# Patient Record
Sex: FEMALE | Race: ASIAN | Employment: PART TIME | URBAN - METROPOLITAN AREA
[De-identification: names, ages, dates, MRNs, and addresses within clinical notes are randomized per-mention and may not be internally consistent; named-entity substitution may affect disease eponyms.]

---

## 2022-05-12 ENCOUNTER — APPOINTMENT (OUTPATIENT)
Dept: CT IMAGING | Facility: HOSPITAL | Age: 27
End: 2022-05-12
Attending: EMERGENCY MEDICINE

## 2022-05-12 ENCOUNTER — HOSPITAL ENCOUNTER (EMERGENCY)
Facility: HOSPITAL | Age: 27
Discharge: HOME | End: 2022-05-12
Attending: EMERGENCY MEDICINE

## 2022-05-12 VITALS
WEIGHT: 130 LBS | RESPIRATION RATE: 20 BRPM | OXYGEN SATURATION: 100 % | DIASTOLIC BLOOD PRESSURE: 61 MMHG | HEART RATE: 85 BPM | SYSTOLIC BLOOD PRESSURE: 101 MMHG | TEMPERATURE: 98 F

## 2022-05-12 DIAGNOSIS — S51.811A LACERATION OF RIGHT FOREARM, INITIAL ENCOUNTER: ICD-10-CM

## 2022-05-12 DIAGNOSIS — I70.208 RADIAL ARTERY OCCLUSION, RIGHT (HCC): Primary | ICD-10-CM

## 2022-05-12 LAB
ANION GAP SERPL CALC-SCNC: 6 MMOL/L (ref 0–18)
B-HCG UR QL: NEGATIVE
BASOPHILS # BLD AUTO: 0.01 X10(3) UL (ref 0–0.2)
BASOPHILS NFR BLD AUTO: 0.1 %
BUN BLD-MCNC: 9 MG/DL (ref 7–18)
BUN/CREAT SERPL: 13.4 (ref 10–20)
CALCIUM BLD-MCNC: 9 MG/DL (ref 8.5–10.1)
CHLORIDE SERPL-SCNC: 102 MMOL/L (ref 98–112)
CO2 SERPL-SCNC: 27 MMOL/L (ref 21–32)
CREAT BLD-MCNC: 0.67 MG/DL
DEPRECATED RDW RBC AUTO: 36 FL (ref 35.1–46.3)
EOSINOPHIL # BLD AUTO: 0.01 X10(3) UL (ref 0–0.7)
EOSINOPHIL NFR BLD AUTO: 0.1 %
ERYTHROCYTE [DISTWIDTH] IN BLOOD BY AUTOMATED COUNT: 16.7 % (ref 11–15)
GLUCOSE BLD-MCNC: 93 MG/DL (ref 70–99)
HCT VFR BLD AUTO: 37.1 %
HGB BLD-MCNC: 11.6 G/DL
IMM GRANULOCYTES # BLD AUTO: 0.01 X10(3) UL (ref 0–1)
IMM GRANULOCYTES NFR BLD: 0.1 %
LYMPHOCYTES # BLD AUTO: 1.45 X10(3) UL (ref 1–4)
LYMPHOCYTES NFR BLD AUTO: 19.4 %
MCH RBC QN AUTO: 20.3 PG (ref 26–34)
MCHC RBC AUTO-ENTMCNC: 31.3 G/DL (ref 31–37)
MCV RBC AUTO: 65 FL
MONOCYTES # BLD AUTO: 0.55 X10(3) UL (ref 0.1–1)
MONOCYTES NFR BLD AUTO: 7.4 %
NEUTROPHILS # BLD AUTO: 5.45 X10 (3) UL (ref 1.5–7.7)
NEUTROPHILS # BLD AUTO: 5.45 X10(3) UL (ref 1.5–7.7)
NEUTROPHILS NFR BLD AUTO: 72.9 %
OSMOLALITY SERPL CALC.SUM OF ELEC: 278 MOSM/KG (ref 275–295)
PLATELET # BLD AUTO: 317 10(3)UL (ref 150–450)
POTASSIUM SERPL-SCNC: 3.9 MMOL/L (ref 3.5–5.1)
RBC # BLD AUTO: 5.71 X10(6)UL
SARS-COV-2 RNA RESP QL NAA+PROBE: NOT DETECTED
SODIUM SERPL-SCNC: 135 MMOL/L (ref 136–145)
WBC # BLD AUTO: 7.5 X10(3) UL (ref 4–11)

## 2022-05-12 PROCEDURE — 73206 CT ANGIO UPR EXTRM W/O&W/DYE: CPT | Performed by: EMERGENCY MEDICINE

## 2022-05-12 PROCEDURE — 99285 EMERGENCY DEPT VISIT HI MDM: CPT

## 2022-05-12 PROCEDURE — 36415 COLL VENOUS BLD VENIPUNCTURE: CPT

## 2022-05-12 PROCEDURE — 80048 BASIC METABOLIC PNL TOTAL CA: CPT | Performed by: EMERGENCY MEDICINE

## 2022-05-12 PROCEDURE — 85060 BLOOD SMEAR INTERPRETATION: CPT | Performed by: EMERGENCY MEDICINE

## 2022-05-12 PROCEDURE — 12001 RPR S/N/AX/GEN/TRNK 2.5CM/<: CPT

## 2022-05-12 PROCEDURE — 85025 COMPLETE CBC W/AUTO DIFF WBC: CPT | Performed by: EMERGENCY MEDICINE

## 2022-05-12 PROCEDURE — 81025 URINE PREGNANCY TEST: CPT

## 2022-05-12 PROCEDURE — 90471 IMMUNIZATION ADMIN: CPT

## 2022-05-12 RX ORDER — HYDROCODONE BITARTRATE AND ACETAMINOPHEN 5; 325 MG/1; MG/1
1 TABLET ORAL ONCE
Status: COMPLETED | OUTPATIENT
Start: 2022-05-12 | End: 2022-05-12

## 2022-05-12 RX ORDER — LIDOCAINE HYDROCHLORIDE AND EPINEPHRINE 20; 5 MG/ML; UG/ML
INJECTION, SOLUTION EPIDURAL; INFILTRATION; INTRACAUDAL; PERINEURAL
Status: COMPLETED
Start: 2022-05-12 | End: 2022-05-12

## 2022-05-12 RX ORDER — ONDANSETRON 4 MG/1
4 TABLET, ORALLY DISINTEGRATING ORAL ONCE
Status: COMPLETED | OUTPATIENT
Start: 2022-05-12 | End: 2022-05-12

## 2022-05-12 RX ORDER — ONDANSETRON 4 MG/1
4 TABLET, ORALLY DISINTEGRATING ORAL ONCE
Status: DISCONTINUED | OUTPATIENT
Start: 2022-05-12 | End: 2022-05-12

## 2022-05-12 RX ORDER — HYDROCODONE BITARTRATE AND ACETAMINOPHEN 5; 325 MG/1; MG/1
1 TABLET ORAL ONCE
Status: DISCONTINUED | OUTPATIENT
Start: 2022-05-12 | End: 2022-05-12

## 2022-05-12 RX ORDER — LIDOCAINE HYDROCHLORIDE AND EPINEPHRINE 20; 5 MG/ML; UG/ML
20 INJECTION, SOLUTION EPIDURAL; INFILTRATION; INTRACAUDAL; PERINEURAL ONCE
Status: COMPLETED | OUTPATIENT
Start: 2022-05-12 | End: 2022-05-12

## 2022-05-12 NOTE — ED QUICK NOTES
Sutures in place to right lower forearm laceration. 2x2 gauze and wrap in place. Discharge paperwork and follow-up discussed with pt, pt verbally understands them. Pt discharged ambulatory with steady gait - no distress noted.

## 2022-05-12 NOTE — ED QUICK NOTES
Orders for admission, patient is aware of plan and ready to go upstairs. Any questions, please call ED RN Bita  at extension [phone].    Type of COVID test sent: Rapid  COVID Suspicion level: Low    Titratable drug(s) infusing:   Rate:    LOC at time of transport:  A&OX[facility]    Other pertinent information    CIWA score=  NIH score=

## 2023-09-12 ENCOUNTER — EXTERNAL LAB (OUTPATIENT)
Dept: OTHER | Age: 28
End: 2023-09-12

## 2023-09-12 LAB — LAB RESULT: NORMAL

## 2023-09-14 ENCOUNTER — HOSPITAL ENCOUNTER (OUTPATIENT)
Age: 28
Discharge: HOME | End: 2023-09-14
Attending: OBSTETRICS & GYNECOLOGY | Admitting: OBSTETRICS & GYNECOLOGY

## 2023-09-14 ENCOUNTER — ANESTHESIA (OUTPATIENT)
Dept: SURGERY | Age: 28
End: 2023-09-14

## 2023-09-14 ENCOUNTER — ANESTHESIA EVENT (OUTPATIENT)
Dept: SURGERY | Age: 28
End: 2023-09-14

## 2023-09-14 LAB
ABO + RH BLD: NORMAL
BASOPHILS # BLD: 0 K/MCL (ref 0–0.3)
BASOPHILS NFR BLD: 0 %
BLD GP AB SCN SERPL QL GEL: NEGATIVE
DEPRECATED RDW RBC: 38.7 FL (ref 39–50)
EOSINOPHIL # BLD: 0 K/MCL (ref 0–0.5)
EOSINOPHIL NFR BLD: 0 %
ERYTHROCYTE [DISTWIDTH] IN BLOOD: 18.6 % (ref 11–15)
HCT VFR BLD CALC: 35.4 % (ref 36–46.5)
HGB BLD-MCNC: 11.7 G/DL (ref 12–15.5)
IMM GRANULOCYTES # BLD AUTO: 0 K/MCL (ref 0–0.2)
IMM GRANULOCYTES # BLD: 0 %
LYMPHOCYTES # BLD: 1.6 K/MCL (ref 1–4.8)
LYMPHOCYTES NFR BLD: 17 %
MCH RBC QN AUTO: 21.1 PG (ref 26–34)
MCHC RBC AUTO-ENTMCNC: 33.1 G/DL (ref 32–36.5)
MCV RBC AUTO: 63.9 FL (ref 78–100)
MONOCYTES # BLD: 0.5 K/MCL (ref 0.3–0.9)
MONOCYTES NFR BLD: 5 %
NEUTROPHILS # BLD: 7.7 K/MCL (ref 1.8–7.7)
NEUTROPHILS NFR BLD: 78 %
NRBC BLD MANUAL-RTO: 0 /100 WBC
PLATELET # BLD AUTO: 366 K/MCL (ref 140–450)
RAINBOW EXTRA TUBES HOLD SPECIMEN: NORMAL
RBC # BLD: 5.54 MIL/MCL (ref 4–5.2)
TYPE AND SCREEN EXPIRATION DATE: NORMAL
WBC # BLD: 9.9 K/MCL (ref 4.2–11)

## 2023-09-14 PROCEDURE — 88274 CYTOGENETICS 25-99: CPT | Performed by: OBSTETRICS & GYNECOLOGY

## 2023-09-14 PROCEDURE — 10002803 HB RX 637: Performed by: OBSTETRICS & GYNECOLOGY

## 2023-09-14 PROCEDURE — 10002801 HB RX 250 W/O HCPCS: Performed by: OBSTETRICS & GYNECOLOGY

## 2023-09-14 PROCEDURE — 10002801 HB RX 250 W/O HCPCS: Performed by: ANESTHESIOLOGY

## 2023-09-14 PROCEDURE — 13000035 HB BASIC CASE EA ADD MINUTE: Performed by: OBSTETRICS & GYNECOLOGY

## 2023-09-14 PROCEDURE — 85025 COMPLETE CBC W/AUTO DIFF WBC: CPT | Performed by: OBSTETRICS & GYNECOLOGY

## 2023-09-14 PROCEDURE — 10002800 HB RX 250 W HCPCS: Performed by: ANESTHESIOLOGY

## 2023-09-14 PROCEDURE — 10004451 HB PACU RECOVERY 1ST 30 MINUTES: Performed by: OBSTETRICS & GYNECOLOGY

## 2023-09-14 PROCEDURE — 13000006 HB ANESTHESIA MAC S/U + 1ST 15 MIN: Performed by: OBSTETRICS & GYNECOLOGY

## 2023-09-14 PROCEDURE — 86900 BLOOD TYPING SEROLOGIC ABO: CPT | Performed by: OBSTETRICS & GYNECOLOGY

## 2023-09-14 PROCEDURE — 13000034 HB BASIC CASE  S/U +1ST 15 MIN: Performed by: OBSTETRICS & GYNECOLOGY

## 2023-09-14 PROCEDURE — A59820 ANES TX MISSED AB COMPLT SURGICALLY; FIR: Performed by: NURSE ANESTHETIST, CERTIFIED REGISTERED

## 2023-09-14 PROCEDURE — 88342 IMHCHEM/IMCYTCHM 1ST ANTB: CPT | Performed by: OBSTETRICS & GYNECOLOGY

## 2023-09-14 PROCEDURE — 10002807 HB RX 258: Performed by: ANESTHESIOLOGY

## 2023-09-14 PROCEDURE — A59820 ANES TX MISSED AB COMPLT SURGICALLY; FIR: Performed by: ANESTHESIOLOGY

## 2023-09-14 PROCEDURE — 88271 CYTOGENETICS DNA PROBE: CPT | Performed by: OBSTETRICS & GYNECOLOGY

## 2023-09-14 PROCEDURE — 10004452 HB PACU ADDL 30 MINUTES: Performed by: OBSTETRICS & GYNECOLOGY

## 2023-09-14 PROCEDURE — 13000001 HB PHASE II RECOVERY EA 30 MINUTES: Performed by: OBSTETRICS & GYNECOLOGY

## 2023-09-14 PROCEDURE — 13000007 HB ANESTHESIA MAC EA ADD MINUTE: Performed by: OBSTETRICS & GYNECOLOGY

## 2023-09-14 RX ORDER — PROPOFOL 10 MG/ML
INJECTION, EMULSION INTRAVENOUS PRN
Status: DISCONTINUED | OUTPATIENT
Start: 2023-09-14 | End: 2023-09-14

## 2023-09-14 RX ORDER — ACETAMINOPHEN 650 MG/1
650 SUPPOSITORY RECTAL EVERY 4 HOURS PRN
Status: DISCONTINUED | OUTPATIENT
Start: 2023-09-14 | End: 2023-09-14 | Stop reason: HOSPADM

## 2023-09-14 RX ORDER — DEXAMETHASONE SODIUM PHOSPHATE 4 MG/ML
INJECTION, SOLUTION INTRA-ARTICULAR; INTRALESIONAL; INTRAMUSCULAR; INTRAVENOUS; SOFT TISSUE PRN
Status: DISCONTINUED | OUTPATIENT
Start: 2023-09-14 | End: 2023-09-14

## 2023-09-14 RX ORDER — ACETAMINOPHEN 325 MG/1
650 TABLET ORAL EVERY 4 HOURS PRN
Status: DISCONTINUED | OUTPATIENT
Start: 2023-09-14 | End: 2023-09-14 | Stop reason: HOSPADM

## 2023-09-14 RX ORDER — 0.9 % SODIUM CHLORIDE 0.9 %
2 VIAL (ML) INJECTION EVERY 12 HOURS SCHEDULED
Status: DISCONTINUED | OUTPATIENT
Start: 2023-09-14 | End: 2023-09-14 | Stop reason: HOSPADM

## 2023-09-14 RX ORDER — DOXYCYCLINE HYCLATE 100 MG/1
100 CAPSULE ORAL ONCE
Status: COMPLETED | OUTPATIENT
Start: 2023-09-14 | End: 2023-09-14

## 2023-09-14 RX ORDER — ONDANSETRON 2 MG/ML
4 INJECTION INTRAMUSCULAR; INTRAVENOUS
Status: DISCONTINUED | OUTPATIENT
Start: 2023-09-14 | End: 2023-09-14 | Stop reason: HOSPADM

## 2023-09-14 RX ORDER — ONDANSETRON 4 MG/1
4 TABLET, ORALLY DISINTEGRATING ORAL EVERY 12 HOURS PRN
Status: DISCONTINUED | OUTPATIENT
Start: 2023-09-14 | End: 2023-09-14 | Stop reason: HOSPADM

## 2023-09-14 RX ORDER — ONDANSETRON 2 MG/ML
4 INJECTION INTRAMUSCULAR; INTRAVENOUS EVERY 12 HOURS PRN
Status: DISCONTINUED | OUTPATIENT
Start: 2023-09-14 | End: 2023-09-14 | Stop reason: HOSPADM

## 2023-09-14 RX ORDER — ALBUTEROL SULFATE 2.5 MG/3ML
2.5 SOLUTION RESPIRATORY (INHALATION)
Status: DISCONTINUED | OUTPATIENT
Start: 2023-09-14 | End: 2023-09-14 | Stop reason: HOSPADM

## 2023-09-14 RX ORDER — NALBUPHINE HYDROCHLORIDE 10 MG/ML
2.5 INJECTION, SOLUTION INTRAMUSCULAR; INTRAVENOUS; SUBCUTANEOUS
Status: DISCONTINUED | OUTPATIENT
Start: 2023-09-14 | End: 2023-09-14 | Stop reason: HOSPADM

## 2023-09-14 RX ORDER — PROCHLORPERAZINE EDISYLATE 5 MG/ML
5 INJECTION INTRAMUSCULAR; INTRAVENOUS
Status: DISCONTINUED | OUTPATIENT
Start: 2023-09-14 | End: 2023-09-14 | Stop reason: HOSPADM

## 2023-09-14 RX ORDER — MAGNESIUM HYDROXIDE 1200 MG/15ML
LIQUID ORAL PRN
Status: DISCONTINUED | OUTPATIENT
Start: 2023-09-14 | End: 2023-09-14 | Stop reason: HOSPADM

## 2023-09-14 RX ORDER — ONDANSETRON 2 MG/ML
INJECTION INTRAMUSCULAR; INTRAVENOUS PRN
Status: DISCONTINUED | OUTPATIENT
Start: 2023-09-14 | End: 2023-09-14

## 2023-09-14 RX ORDER — HYDROCODONE BITARTRATE AND ACETAMINOPHEN 5; 325 MG/1; MG/1
1 TABLET ORAL
Status: DISCONTINUED | OUTPATIENT
Start: 2023-09-14 | End: 2023-09-14 | Stop reason: HOSPADM

## 2023-09-14 RX ORDER — NALOXONE HCL 0.4 MG/ML
0.2 VIAL (ML) INJECTION EVERY 5 MIN PRN
Status: DISCONTINUED | OUTPATIENT
Start: 2023-09-14 | End: 2023-09-14 | Stop reason: HOSPADM

## 2023-09-14 RX ORDER — SODIUM CHLORIDE, SODIUM LACTATE, POTASSIUM CHLORIDE, CALCIUM CHLORIDE 600; 310; 30; 20 MG/100ML; MG/100ML; MG/100ML; MG/100ML
INJECTION, SOLUTION INTRAVENOUS CONTINUOUS PRN
Status: DISCONTINUED | OUTPATIENT
Start: 2023-09-14 | End: 2023-09-14

## 2023-09-14 RX ADMIN — ONDANSETRON 4 MG: 2 INJECTION INTRAMUSCULAR; INTRAVENOUS at 11:42

## 2023-09-14 RX ADMIN — PROPOFOL 50 MG: 10 INJECTION, EMULSION INTRAVENOUS at 11:35

## 2023-09-14 RX ADMIN — DOXYCYCLINE 100 MG: 100 CAPSULE ORAL at 10:26

## 2023-09-14 RX ADMIN — FENTANYL CITRATE 50 MCG: 50 INJECTION, SOLUTION INTRAMUSCULAR; INTRAVENOUS at 12:00

## 2023-09-14 RX ADMIN — SODIUM CHLORIDE, POTASSIUM CHLORIDE, SODIUM LACTATE AND CALCIUM CHLORIDE: 600; 310; 30; 20 INJECTION, SOLUTION INTRAVENOUS at 12:06

## 2023-09-14 RX ADMIN — PROPOFOL 30 MG: 10 INJECTION, EMULSION INTRAVENOUS at 11:36

## 2023-09-14 RX ADMIN — FENTANYL CITRATE 50 MCG: 50 INJECTION, SOLUTION INTRAMUSCULAR; INTRAVENOUS at 11:34

## 2023-09-14 RX ADMIN — KETOROLAC TROMETHAMINE 30 MG: 30 INJECTION, SOLUTION INTRAMUSCULAR at 12:04

## 2023-09-14 RX ADMIN — DEXAMETHASONE SODIUM PHOSPHATE 4 MG: 4 INJECTION, SOLUTION INTRAMUSCULAR; INTRAVENOUS at 11:42

## 2023-09-14 RX ADMIN — PROPOFOL 160 MCG/KG/MIN: 10 INJECTION, EMULSION INTRAVENOUS at 11:34

## 2023-09-14 RX ADMIN — SODIUM CHLORIDE, POTASSIUM CHLORIDE, SODIUM LACTATE AND CALCIUM CHLORIDE: 600; 310; 30; 20 INJECTION, SOLUTION INTRAVENOUS at 11:28

## 2023-09-14 ASSESSMENT — ACTIVITIES OF DAILY LIVING (ADL)
NEEDS_ASSIST: NO
HISTORY OF FALLING IN THE LAST YEAR (PRIOR TO ADMISSION): NO
ADL_BEFORE_ADMISSION: INDEPENDENT
SENSORY_SUPPORT_DEVICES: EYEGLASSES
CHRONIC_PAIN_PRESENT: NO
ADL_SHORT_OF_BREATH: NO
RECENT_DECLINE_ADL: NO
ADL_SCORE: 12

## 2023-09-14 ASSESSMENT — COGNITIVE AND FUNCTIONAL STATUS - GENERAL
ARE YOU DEAF OR DO YOU HAVE SERIOUS DIFFICULTY  HEARING: NO
ARE YOU BLIND OR DO YOU HAVE SERIOUS DIFFICULTY SEEING, EVEN WHEN WEARING GLASSES: NO

## 2023-09-14 ASSESSMENT — PAIN SCALES - GENERAL
PAINLEVEL_OUTOF10: 0
PAINLEVEL_OUTOF10: 1
PAINLEVEL_OUTOF10: 0
PAINLEVEL_OUTOF10: 1

## 2023-09-15 VITALS
DIASTOLIC BLOOD PRESSURE: 83 MMHG | HEART RATE: 72 BPM | RESPIRATION RATE: 16 BRPM | SYSTOLIC BLOOD PRESSURE: 120 MMHG | OXYGEN SATURATION: 100 % | TEMPERATURE: 97.5 F | HEIGHT: 64 IN | BODY MASS INDEX: 26.35 KG/M2 | WEIGHT: 154.32 LBS

## 2023-09-21 ENCOUNTER — ANESTHESIA (OUTPATIENT)
Dept: SURGERY | Age: 28
End: 2023-09-21

## 2023-09-21 ENCOUNTER — ANESTHESIA EVENT (OUTPATIENT)
Dept: SURGERY | Age: 28
End: 2023-09-21

## 2023-09-21 ENCOUNTER — HOSPITAL ENCOUNTER (OUTPATIENT)
Age: 28
Discharge: HOME | End: 2023-09-21
Attending: OBSTETRICS & GYNECOLOGY | Admitting: OBSTETRICS & GYNECOLOGY

## 2023-09-21 VITALS
DIASTOLIC BLOOD PRESSURE: 83 MMHG | SYSTOLIC BLOOD PRESSURE: 116 MMHG | RESPIRATION RATE: 19 BRPM | OXYGEN SATURATION: 100 % | WEIGHT: 153.66 LBS | HEART RATE: 99 BPM | BODY MASS INDEX: 26.23 KG/M2 | HEIGHT: 64 IN | TEMPERATURE: 97.3 F

## 2023-09-21 LAB
ALBUMIN SERPL-MCNC: 3.6 G/DL (ref 3.6–5.1)
ALBUMIN/GLOB SERPL: 1 {RATIO} (ref 1–2.4)
ALP SERPL-CCNC: 89 UNITS/L (ref 45–117)
ALT SERPL-CCNC: 27 UNITS/L
ANION GAP SERPL CALC-SCNC: 8 MMOL/L (ref 7–19)
ASR DISCLAIMER: NORMAL
AST SERPL-CCNC: 25 UNITS/L
BASOPHILS # BLD: 0 K/MCL (ref 0–0.3)
BASOPHILS NFR BLD: 0 %
BILIRUB SERPL-MCNC: 0.5 MG/DL (ref 0.2–1)
BUN SERPL-MCNC: 8 MG/DL (ref 6–20)
BUN/CREAT SERPL: 14 (ref 7–25)
CALCIUM SERPL-MCNC: 8.2 MG/DL (ref 8.4–10.2)
CASE RPRT: NORMAL
CHLORIDE SERPL-SCNC: 109 MMOL/L (ref 97–110)
CLINICAL INFO: NORMAL
CO2 SERPL-SCNC: 24 MMOL/L (ref 21–32)
CREAT SERPL-MCNC: 0.57 MG/DL (ref 0.51–0.95)
DEPRECATED RDW RBC: 42.4 FL (ref 39–50)
EGFRCR SERPLBLD CKD-EPI 2021: >90 ML/MIN/{1.73_M2}
EOSINOPHIL # BLD: 0 K/MCL (ref 0–0.5)
EOSINOPHIL NFR BLD: 0 %
ERYTHROCYTE [DISTWIDTH] IN BLOOD: 19.4 % (ref 11–15)
FASTING DURATION TIME PATIENT: ABNORMAL H
GLOBULIN SER-MCNC: 3.5 G/DL (ref 2–4)
GLUCOSE SERPL-MCNC: 79 MG/DL (ref 70–99)
HCG SERPL-ACNC: 8821 MUNITS/ML
HCT VFR BLD CALC: 33.5 % (ref 36–46.5)
HGB BLD-MCNC: 10.5 G/DL (ref 12–15.5)
IMM GRANULOCYTES # BLD AUTO: 0 K/MCL (ref 0–0.2)
IMM GRANULOCYTES # BLD: 0 %
LYMPHOCYTES # BLD: 1.9 K/MCL (ref 1–4.8)
LYMPHOCYTES NFR BLD: 18 %
MCH RBC QN AUTO: 20.8 PG (ref 26–34)
MCHC RBC AUTO-ENTMCNC: 31.3 G/DL (ref 32–36.5)
MCV RBC AUTO: 66.5 FL (ref 78–100)
MONOCYTES # BLD: 0.6 K/MCL (ref 0.3–0.9)
MONOCYTES NFR BLD: 6 %
NEUTROPHILS # BLD: 7.9 K/MCL (ref 1.8–7.7)
NEUTROPHILS NFR BLD: 76 %
NRBC BLD MANUAL-RTO: 0 /100 WBC
PATH REPORT.FINAL DX SPEC: NORMAL
PATH REPORT.FINAL DX SPEC: NORMAL
PATH REPORT.GROSS SPEC: NORMAL
PLATELET # BLD AUTO: 374 K/MCL (ref 140–450)
POTASSIUM SERPL-SCNC: 4 MMOL/L (ref 3.4–5.1)
PROT SERPL-MCNC: 7.1 G/DL (ref 6.4–8.2)
RBC # BLD: 5.04 MIL/MCL (ref 4–5.2)
SODIUM SERPL-SCNC: 137 MMOL/L (ref 135–145)
WBC # BLD: 10.5 K/MCL (ref 4.2–11)

## 2023-09-21 PROCEDURE — 13000034 HB BASIC CASE  S/U +1ST 15 MIN: Performed by: OBSTETRICS & GYNECOLOGY

## 2023-09-21 PROCEDURE — 99140 ANES COMP EMERGENCY COND: CPT | Performed by: NURSE ANESTHETIST, CERTIFIED REGISTERED

## 2023-09-21 PROCEDURE — 10002801 HB RX 250 W/O HCPCS: Performed by: NURSE ANESTHETIST, CERTIFIED REGISTERED

## 2023-09-21 PROCEDURE — 13000003 HB ANESTHESIA  GENERAL EA ADD MINUTE: Performed by: OBSTETRICS & GYNECOLOGY

## 2023-09-21 PROCEDURE — 10002800 HB RX 250 W HCPCS: Performed by: NURSE ANESTHETIST, CERTIFIED REGISTERED

## 2023-09-21 PROCEDURE — 10002803 HB RX 637: Performed by: OBSTETRICS & GYNECOLOGY

## 2023-09-21 PROCEDURE — 99140 ANES COMP EMERGENCY COND: CPT | Performed by: ANESTHESIOLOGY

## 2023-09-21 PROCEDURE — 84702 CHORIONIC GONADOTROPIN TEST: CPT | Performed by: OBSTETRICS & GYNECOLOGY

## 2023-09-21 PROCEDURE — 10004451 HB PACU RECOVERY 1ST 30 MINUTES: Performed by: OBSTETRICS & GYNECOLOGY

## 2023-09-21 PROCEDURE — A58120 ANES DILAT & CURET DX &/OR THERAP (NON O: Performed by: ANESTHESIOLOGY

## 2023-09-21 PROCEDURE — A58120 ANES DILAT & CURET DX &/OR THERAP (NON O: Performed by: NURSE ANESTHETIST, CERTIFIED REGISTERED

## 2023-09-21 PROCEDURE — 80053 COMPREHEN METABOLIC PANEL: CPT | Performed by: OBSTETRICS & GYNECOLOGY

## 2023-09-21 PROCEDURE — 85025 COMPLETE CBC W/AUTO DIFF WBC: CPT | Performed by: OBSTETRICS & GYNECOLOGY

## 2023-09-21 PROCEDURE — 13000001 HB PHASE II RECOVERY EA 30 MINUTES: Performed by: OBSTETRICS & GYNECOLOGY

## 2023-09-21 PROCEDURE — 88305 TISSUE EXAM BY PATHOLOGIST: CPT | Performed by: OBSTETRICS & GYNECOLOGY

## 2023-09-21 PROCEDURE — 10002801 HB RX 250 W/O HCPCS: Performed by: OBSTETRICS & GYNECOLOGY

## 2023-09-21 PROCEDURE — 10004452 HB PACU ADDL 30 MINUTES: Performed by: OBSTETRICS & GYNECOLOGY

## 2023-09-21 PROCEDURE — 10002807 HB RX 258: Performed by: NURSE ANESTHETIST, CERTIFIED REGISTERED

## 2023-09-21 PROCEDURE — 13000002 HB ANESTHESIA  GENERAL  S/U + 1ST 15 MIN: Performed by: OBSTETRICS & GYNECOLOGY

## 2023-09-21 PROCEDURE — 13000035 HB BASIC CASE EA ADD MINUTE: Performed by: OBSTETRICS & GYNECOLOGY

## 2023-09-21 RX ORDER — DEXAMETHASONE SODIUM PHOSPHATE 4 MG/ML
INJECTION, SOLUTION INTRA-ARTICULAR; INTRALESIONAL; INTRAMUSCULAR; INTRAVENOUS; SOFT TISSUE PRN
Status: DISCONTINUED | OUTPATIENT
Start: 2023-09-21 | End: 2023-09-21

## 2023-09-21 RX ORDER — ACETAMINOPHEN 325 MG/1
650 TABLET ORAL EVERY 4 HOURS PRN
Status: DISCONTINUED | OUTPATIENT
Start: 2023-09-21 | End: 2023-09-26 | Stop reason: HOSPADM

## 2023-09-21 RX ORDER — DOXYCYCLINE HYCLATE 100 MG/1
200 CAPSULE ORAL ONCE
Status: COMPLETED | OUTPATIENT
Start: 2023-09-21 | End: 2023-09-21

## 2023-09-21 RX ORDER — METOCLOPRAMIDE HYDROCHLORIDE 5 MG/ML
INJECTION INTRAMUSCULAR; INTRAVENOUS PRN
Status: DISCONTINUED | OUTPATIENT
Start: 2023-09-21 | End: 2023-09-21

## 2023-09-21 RX ORDER — DOXYCYCLINE HYCLATE 100 MG/1
100 CAPSULE ORAL 2 TIMES DAILY
Qty: 20 CAPSULE | Refills: 0 | Status: SHIPPED | OUTPATIENT
Start: 2023-09-21 | End: 2023-10-01

## 2023-09-21 RX ORDER — MAGNESIUM HYDROXIDE 1200 MG/15ML
LIQUID ORAL PRN
Status: DISCONTINUED | OUTPATIENT
Start: 2023-09-21 | End: 2023-09-21 | Stop reason: HOSPADM

## 2023-09-21 RX ORDER — ACETAMINOPHEN 650 MG/1
650 SUPPOSITORY RECTAL EVERY 4 HOURS PRN
Status: DISCONTINUED | OUTPATIENT
Start: 2023-09-21 | End: 2023-09-26 | Stop reason: HOSPADM

## 2023-09-21 RX ORDER — LIDOCAINE HYDROCHLORIDE 20 MG/ML
INJECTION, SOLUTION INFILTRATION; PERINEURAL PRN
Status: DISCONTINUED | OUTPATIENT
Start: 2023-09-21 | End: 2023-09-21

## 2023-09-21 RX ORDER — PROPOFOL 10 MG/ML
INJECTION, EMULSION INTRAVENOUS PRN
Status: DISCONTINUED | OUTPATIENT
Start: 2023-09-21 | End: 2023-09-21

## 2023-09-21 RX ORDER — SODIUM CHLORIDE, SODIUM LACTATE, POTASSIUM CHLORIDE, CALCIUM CHLORIDE 600; 310; 30; 20 MG/100ML; MG/100ML; MG/100ML; MG/100ML
INJECTION, SOLUTION INTRAVENOUS CONTINUOUS PRN
Status: DISCONTINUED | OUTPATIENT
Start: 2023-09-21 | End: 2023-09-21

## 2023-09-21 RX ORDER — ONDANSETRON 2 MG/ML
INJECTION INTRAMUSCULAR; INTRAVENOUS PRN
Status: DISCONTINUED | OUTPATIENT
Start: 2023-09-21 | End: 2023-09-21

## 2023-09-21 RX ORDER — ONDANSETRON 2 MG/ML
4 INJECTION INTRAMUSCULAR; INTRAVENOUS 2 TIMES DAILY PRN
Status: DISCONTINUED | OUTPATIENT
Start: 2023-09-21 | End: 2023-09-26 | Stop reason: HOSPADM

## 2023-09-21 RX ORDER — DIPHENHYDRAMINE HYDROCHLORIDE 50 MG/ML
12.5 INJECTION INTRAMUSCULAR; INTRAVENOUS EVERY 4 HOURS PRN
Status: DISCONTINUED | OUTPATIENT
Start: 2023-09-21 | End: 2023-09-26 | Stop reason: HOSPADM

## 2023-09-21 RX ADMIN — DEXAMETHASONE SODIUM PHOSPHATE 4 MG: 4 INJECTION, SOLUTION INTRAMUSCULAR; INTRAVENOUS at 18:07

## 2023-09-21 RX ADMIN — KETOROLAC TROMETHAMINE 30 MG: 30 INJECTION, SOLUTION INTRAMUSCULAR at 18:37

## 2023-09-21 RX ADMIN — ONDANSETRON 4 MG: 2 INJECTION INTRAMUSCULAR; INTRAVENOUS at 19:58

## 2023-09-21 RX ADMIN — LIDOCAINE HYDROCHLORIDE 5 ML: 20 INJECTION, SOLUTION INFILTRATION; PERINEURAL at 18:05

## 2023-09-21 RX ADMIN — FENTANYL CITRATE 25 MCG: 50 INJECTION, SOLUTION INTRAMUSCULAR; INTRAVENOUS at 19:01

## 2023-09-21 RX ADMIN — ONDANSETRON 4 MG: 2 INJECTION INTRAMUSCULAR; INTRAVENOUS at 18:07

## 2023-09-21 RX ADMIN — FENTANYL CITRATE 50 MCG: 50 INJECTION, SOLUTION INTRAMUSCULAR; INTRAVENOUS at 18:26

## 2023-09-21 RX ADMIN — FENTANYL CITRATE 50 MCG: 50 INJECTION, SOLUTION INTRAMUSCULAR; INTRAVENOUS at 18:05

## 2023-09-21 RX ADMIN — METOCLOPRAMIDE 10 MG: 5 INJECTION, SOLUTION INTRAMUSCULAR; INTRAVENOUS at 18:07

## 2023-09-21 RX ADMIN — DOXYCYCLINE 200 MG: 100 CAPSULE ORAL at 15:16

## 2023-09-21 RX ADMIN — PROPOFOL 200 MG: 10 INJECTION, EMULSION INTRAVENOUS at 18:05

## 2023-09-21 RX ADMIN — SODIUM CHLORIDE, POTASSIUM CHLORIDE, SODIUM LACTATE AND CALCIUM CHLORIDE: 600; 310; 30; 20 INJECTION, SOLUTION INTRAVENOUS at 17:57

## 2023-09-21 ASSESSMENT — PAIN SCALES - GENERAL
PAINLEVEL_OUTOF10: 0
PAINLEVEL_OUTOF10: 5
PAINLEVEL_OUTOF10: 4
PAINLEVEL_OUTOF10: 0

## 2023-09-21 ASSESSMENT — PAIN SCALES - WONG BAKER
WONGBAKER_NUMERICALRESPONSE: 2
WONGBAKER_NUMERICALRESPONSE: 4

## 2023-09-22 LAB
BANDING TECHNIQUE: ABNORMAL
CELLS COUNTED: ABNORMAL
GENE ANALYSIS NARR RPT DOC: ABNORMAL
INTERPRETATION: ABNORMAL
KARYOTYP CVS: ABNORMAL
Lab: ABNORMAL
Lab: ABNORMAL
RESULT CATEGORY: ABNORMAL

## 2023-09-25 LAB
ASR DISCLAIMER: NORMAL
CASE RPRT: NORMAL
CLINICAL INFO: NORMAL
PATH REPORT.FINAL DX SPEC: NORMAL
PATH REPORT.GROSS SPEC: NORMAL

## 2023-11-01 ENCOUNTER — HOSPITAL ENCOUNTER (EMERGENCY)
Age: 28
Discharge: LEFT BEFORE DISCHARGE | End: 2023-11-01

## 2023-11-01 VITALS
TEMPERATURE: 98.1 F | HEART RATE: 104 BPM | OXYGEN SATURATION: 100 % | RESPIRATION RATE: 18 BRPM | SYSTOLIC BLOOD PRESSURE: 137 MMHG | DIASTOLIC BLOOD PRESSURE: 81 MMHG

## 2023-11-01 PROCEDURE — 10003627 HB COUNTER ED NO SERVICE

## 2023-11-01 ASSESSMENT — PAIN SCALES - GENERAL: PAINLEVEL_OUTOF10: 9

## 2024-03-13 ENCOUNTER — TELEPHONE (OUTPATIENT)
Dept: OBGYN CLINIC | Facility: CLINIC | Age: 29
End: 2024-03-13

## 2024-03-13 NOTE — TELEPHONE ENCOUNTER
Pt called to sched OBN appt. LMP 2/13/24 +hpt.   Spoke with pt about male providers and need to see them throughout the pregnancy, pt expressed understanding about need to see All providers throughout pregnancy since it will be the Dr on call for the delivery. Apt for OBN made 4/3/24. Pt will start PRN vit DHA, Folic acid, Iron.  Ht 5'4\" wt154   BMI 26.4

## 2024-03-28 ENCOUNTER — TELEPHONE (OUTPATIENT)
Dept: OBGYN CLINIC | Facility: CLINIC | Age: 29
End: 2024-03-28

## 2024-03-28 NOTE — TELEPHONE ENCOUNTER
6w3d based on LMP of 2/13/2024. Hx of partial molar pregnancy. Pt is worried d/t this hx and is asking when she will have her first ultrasound. Pt informed that ultrasound will be completed an NPN appt. Pt denies any one sided pain, no spotting or bleeding. We discussed bleeding, pain and ectopic precautions. Pt states understanding.

## 2024-03-28 NOTE — TELEPHONE ENCOUNTER
Previous pregnancy was a Partial molar pregnancy that miscarried.    4/3 will be pt 7th week and concerned about previous history and asking if ultrasound could be ordered. And if dr James appt could be made so she is not stressing over this.

## 2024-04-03 ENCOUNTER — NURSE ONLY (OUTPATIENT)
Dept: OBGYN CLINIC | Facility: CLINIC | Age: 29
End: 2024-04-03

## 2024-04-03 VITALS — HEIGHT: 64 IN | BODY MASS INDEX: 22 KG/M2

## 2024-04-03 DIAGNOSIS — Z34.81 ENCOUNTER FOR SUPERVISION OF OTHER NORMAL PREGNANCY IN FIRST TRIMESTER (HCC): Primary | ICD-10-CM

## 2024-04-03 RX ORDER — CHOLECALCIFEROL (VITAMIN D3) 25 MCG
1 TABLET,CHEWABLE ORAL DAILY
COMMUNITY

## 2024-04-03 NOTE — PROGRESS NOTES
Pt seen for OBN appt today with no complaints. Normal PN labs, plus varicella, and HCG qual ordered. Pt advised all labs must be completed and resulted prior to NPN appt. If labs are not completed and resulted the NPN appt will be cancelled. Pt informed again of both male and female providers and the need to rotate PN appt with all providers since OB on-call will be the one that delivers her. Assisted pt with scheduling NPN appt with MD.      Height: 5'4\"  Weight: 154 lb  BMI: 26.4    Partner's name is Alex Gallagher contact #[phone]; race:   Occupation: [employer]       MEDICAL HISTORY    Anemia No    Anesthetic complications No    Anxiety/Depression  No    Autoimmune Disorder No    Asthma  No    Cancer No    Diabetes  No    Gyne/breast Surgery Yes D&C x 2 in 2023   Heart Disease No    Hepatitis/Liver Disease  No    History of blood transfusion No    History of abnormal pap No    Hypertension  No    Infertility  No    Kidney Disease/Frequent UTIs  No    Medication Allergies No    Latex Allergies No    Food Allergies  No    Neurological Disorder/Epilepsy No    Operations/Hospitalizations No    TB exposure  No    Thyroid Dysfunction No    Trauma/Violence  No    Uterine Anomaly  No    Uterine Fibroids  No    Variocosities/DVTs No    Smoker No    Drug usage in prior year No    Alcohol No    Would you accept a blood transfusion? If no, are you a Mormon? Yes            INFECTION HISTORY    Chlamydia No    Pt or partner have hx of Genital Herpes No    Gonorrhea No    Hepatitis B No    HIV No    HPV No    MRSA No    Syphilis No    Tattoos No    Live with someone or Exposed to TB No    Rash or viral illness since LMP  No    Varicella Yes In childhood   Pets No        GENETICS SCREENING    Genetic Screening    Genetic Screening/Teratology Counseling- Includes patient, baby's father, or anyone in either family with:  Patient's age 35 years or older as of estimated date of delivery: No      Thalassemia (Italian, Greek, Mediterranean, or  background): MCV less than 80: No     Neural tube defect (Meningomyelocele, Spina bifida, or Anencephaly): No     Congenital heart defect: No     Down syndrome: No     Milton-Sachs (Ashkenazi Protestant, Cajun, Zambian Kyrgyz): No     Canavan disease (Ashkenazi Protestant): No     Familial dysautonomia (Ashkenazi Protestant): No     Sickle cell disease or trait (): No     Hemophilia or other blood disorders: No     Muscular dystrophy: No    Cystic fibrosis: No     Chang's chorea: No     Intellectual disability and/or autism: No     Other inherited genetic or chromosomal disorder: No     Maternal metabolic disorder (eg. Type 1 diabetes, PKU): No     Patient or baby's father had child with birth defects not listed above: No     Recurrent pregnancy loss, or a stillbirth: No     Medications (including supplements, vitamins, herbs, or OTC drugs)/illicit/recreational drugs/alcohol since last menstrual period: Yes     If yes, agent(s) and strength/dosage: PNV                 MISC    Infant vaccinations  Yes      Pt. Has answered NO 5P questions and has NO  risk factors.    Pt. Given What pregnant women need to know handout.

## 2024-04-12 ENCOUNTER — LAB ENCOUNTER (OUTPATIENT)
Dept: LAB | Facility: HOSPITAL | Age: 29
End: 2024-04-12
Attending: OBSTETRICS & GYNECOLOGY
Payer: COMMERCIAL

## 2024-04-12 DIAGNOSIS — Z34.81 ENCOUNTER FOR SUPERVISION OF OTHER NORMAL PREGNANCY, FIRST TRIMESTER (HCC): Primary | ICD-10-CM

## 2024-04-12 DIAGNOSIS — Z34.81 ENCOUNTER FOR SUPERVISION OF OTHER NORMAL PREGNANCY IN FIRST TRIMESTER (HCC): ICD-10-CM

## 2024-04-12 LAB
ANTIBODY SCREEN: NEGATIVE
BASOPHILS # BLD AUTO: 0.02 X10(3) UL (ref 0–0.2)
BASOPHILS NFR BLD AUTO: 0.2 %
DEPRECATED RDW RBC AUTO: 38.6 FL (ref 35.1–46.3)
EOSINOPHIL # BLD AUTO: 0.02 X10(3) UL (ref 0–0.7)
EOSINOPHIL NFR BLD AUTO: 0.2 %
ERYTHROCYTE [DISTWIDTH] IN BLOOD BY AUTOMATED COUNT: 18.9 % (ref 11–15)
HBV SURFACE AG SER-ACNC: <0.1 [IU]/L
HBV SURFACE AG SERPL QL IA: NONREACTIVE
HCG SERPL QL: POSITIVE
HCT VFR BLD AUTO: 35 %
HCV AB SERPL QL IA: NONREACTIVE
HGB BLD-MCNC: 11.5 G/DL
IMM GRANULOCYTES # BLD AUTO: 0.02 X10(3) UL (ref 0–1)
IMM GRANULOCYTES NFR BLD: 0.2 %
LYMPHOCYTES # BLD AUTO: 1.89 X10(3) UL (ref 1–4)
LYMPHOCYTES NFR BLD AUTO: 22.2 %
MCH RBC QN AUTO: 20.7 PG (ref 26–34)
MCHC RBC AUTO-ENTMCNC: 32.9 G/DL (ref 31–37)
MCV RBC AUTO: 63.1 FL
MONOCYTES # BLD AUTO: 0.64 X10(3) UL (ref 0.1–1)
MONOCYTES NFR BLD AUTO: 7.5 %
NEUTROPHILS # BLD AUTO: 5.91 X10 (3) UL (ref 1.5–7.7)
NEUTROPHILS # BLD AUTO: 5.91 X10(3) UL (ref 1.5–7.7)
NEUTROPHILS NFR BLD AUTO: 69.7 %
PLATELET # BLD AUTO: 372 10(3)UL (ref 150–450)
RBC # BLD AUTO: 5.55 X10(6)UL
RH BLOOD TYPE: POSITIVE
RUBV IGG SER QL: POSITIVE
RUBV IGG SER-ACNC: >500 IU/ML (ref 10–?)
T PALLIDUM AB SER QL IA: NONREACTIVE
WBC # BLD AUTO: 8.5 X10(3) UL (ref 4–11)

## 2024-04-12 PROCEDURE — 87340 HEPATITIS B SURFACE AG IA: CPT | Performed by: OBSTETRICS & GYNECOLOGY

## 2024-04-12 PROCEDURE — 86780 TREPONEMA PALLIDUM: CPT | Performed by: OBSTETRICS & GYNECOLOGY

## 2024-04-12 PROCEDURE — 85025 COMPLETE CBC W/AUTO DIFF WBC: CPT | Performed by: OBSTETRICS & GYNECOLOGY

## 2024-04-12 PROCEDURE — 86762 RUBELLA ANTIBODY: CPT

## 2024-04-12 PROCEDURE — 86850 RBC ANTIBODY SCREEN: CPT

## 2024-04-12 PROCEDURE — 84703 CHORIONIC GONADOTROPIN ASSAY: CPT

## 2024-04-12 PROCEDURE — 87086 URINE CULTURE/COLONY COUNT: CPT | Performed by: OBSTETRICS & GYNECOLOGY

## 2024-04-12 PROCEDURE — 87389 HIV-1 AG W/HIV-1&-2 AB AG IA: CPT | Performed by: OBSTETRICS & GYNECOLOGY

## 2024-04-12 PROCEDURE — 86900 BLOOD TYPING SEROLOGIC ABO: CPT

## 2024-04-12 PROCEDURE — 86787 VARICELLA-ZOSTER ANTIBODY: CPT

## 2024-04-12 PROCEDURE — 85060 BLOOD SMEAR INTERPRETATION: CPT | Performed by: OBSTETRICS & GYNECOLOGY

## 2024-04-12 PROCEDURE — 86901 BLOOD TYPING SEROLOGIC RH(D): CPT

## 2024-04-12 PROCEDURE — 86803 HEPATITIS C AB TEST: CPT | Performed by: OBSTETRICS & GYNECOLOGY

## 2024-04-12 PROCEDURE — 36415 COLL VENOUS BLD VENIPUNCTURE: CPT | Performed by: OBSTETRICS & GYNECOLOGY

## 2024-04-15 LAB — VZV IGG SER IA-ACNC: 1309 (ref 165–?)

## 2024-04-17 ENCOUNTER — TELEPHONE (OUTPATIENT)
Dept: OBGYN CLINIC | Facility: CLINIC | Age: 29
End: 2024-04-17

## 2024-04-17 DIAGNOSIS — Z34.81 ENCOUNTER FOR SUPERVISION OF OTHER NORMAL PREGNANCY IN FIRST TRIMESTER (HCC): Primary | ICD-10-CM

## 2024-04-17 NOTE — TELEPHONE ENCOUNTER
Pt informed of results and recs.  Orders placed.  Pt has NPN appt tomorrow and states she will get blood work done then.

## 2024-04-17 NOTE — TELEPHONE ENCOUNTER
----- Message from Barrett Mckinney DO sent at 4/16/2024  8:07 PM CDT -----  Needs iron studies and hgb electrophoresis

## 2024-04-18 ENCOUNTER — INITIAL PRENATAL (OUTPATIENT)
Dept: OBGYN CLINIC | Facility: CLINIC | Age: 29
End: 2024-04-18

## 2024-04-18 ENCOUNTER — TELEPHONE (OUTPATIENT)
Dept: OBGYN CLINIC | Facility: CLINIC | Age: 29
End: 2024-04-18

## 2024-04-18 VITALS
DIASTOLIC BLOOD PRESSURE: 77 MMHG | BODY MASS INDEX: 27 KG/M2 | SYSTOLIC BLOOD PRESSURE: 113 MMHG | WEIGHT: 156 LBS | HEART RATE: 94 BPM

## 2024-04-18 DIAGNOSIS — Z36.9 FIRST TRIMESTER SCREENING (HCC): Primary | ICD-10-CM

## 2024-04-18 DIAGNOSIS — O36.80X0 PREGNANCY WITH UNCERTAIN FETAL VIABILITY, SINGLE OR UNSPECIFIED FETUS (HCC): ICD-10-CM

## 2024-04-18 DIAGNOSIS — Z34.01 ENCOUNTER FOR SUPERVISION OF NORMAL FIRST PREGNANCY IN FIRST TRIMESTER (HCC): Primary | ICD-10-CM

## 2024-04-18 LAB
APPEARANCE: CLEAR
GLUCOSE (URINE DIPSTICK): NEGATIVE MG/DL
KETONES (URINE DIPSTICK): NEGATIVE MG/DL
MULTISTIX LOT#: NORMAL NUMERIC
NITRITE, URINE: NEGATIVE
PROTEIN (URINE DIPSTICK): NEGATIVE MG/DL
URINE-COLOR: YELLOW

## 2024-04-18 PROCEDURE — 87491 CHLMYD TRACH DNA AMP PROBE: CPT | Performed by: OBSTETRICS & GYNECOLOGY

## 2024-04-18 PROCEDURE — 87661 TRICHOMONAS VAGINALIS AMPLIF: CPT | Performed by: OBSTETRICS & GYNECOLOGY

## 2024-04-18 PROCEDURE — 87591 N.GONORRHOEAE DNA AMP PROB: CPT | Performed by: OBSTETRICS & GYNECOLOGY

## 2024-04-18 NOTE — PROGRESS NOTES
Pap w/ GC/Chl/Trich. Wishes for FTS. Aware of seeing male MDs during office & may be there for delivery.

## 2024-04-18 NOTE — TELEPHONE ENCOUNTER
Pt wishes / needs the following. Please do referral & send MFM order for:    [  x]  FTS    DX:  screen    [  ]  medical consult    [  ]  16 wk cervical length    [  ]  level 2 ultrasound    [   ] fetal echo    [  ]  growth ultrasound at 32 wks    [  ]  serial ultrasound    [  ]  NSTs once 36 weeks

## 2024-04-19 LAB
C TRACH DNA SPEC QL NAA+PROBE: NEGATIVE
N GONORRHOEA DNA SPEC QL NAA+PROBE: NEGATIVE
T VAGINALIS RRNA SPEC QL NAA+PROBE: NEGATIVE

## 2024-04-19 NOTE — TELEPHONE ENCOUNTER
Spoke to pt. Aware FTS order was placed provided MFM number. Encouraged patient to call ASAP due to MFM booking up quickly. Pt verbalized understandings

## 2024-05-14 ENCOUNTER — ROUTINE PRENATAL (OUTPATIENT)
Dept: OBGYN CLINIC | Facility: CLINIC | Age: 29
End: 2024-05-14

## 2024-05-14 VITALS
DIASTOLIC BLOOD PRESSURE: 81 MMHG | SYSTOLIC BLOOD PRESSURE: 120 MMHG | HEART RATE: 106 BPM | BODY MASS INDEX: 27 KG/M2 | WEIGHT: 159.63 LBS

## 2024-05-14 DIAGNOSIS — Z34.91 ENCOUNTER FOR SUPERVISION OF NORMAL PREGNANCY IN FIRST TRIMESTER, UNSPECIFIED GRAVIDITY (HCC): Primary | ICD-10-CM

## 2024-05-20 ENCOUNTER — TELEPHONE (OUTPATIENT)
Dept: OBGYN CLINIC | Facility: CLINIC | Age: 29
End: 2024-05-20

## 2024-05-20 NOTE — TELEPHONE ENCOUNTER
[facility]  [id number] [place] [address], [address]  [place] [address]    Office [phone],Fax [phone]           Shayne Birch is a 74 y.o. male presents for f/up visit    Assessment/Recommendations:      Coronary artery disease status post CABG in 1998. LIMA-LAD, SVG-Diag, SVG-OM.  .  Chest pressure with daily walk over last several months, no progressing  - aspirin dose to 81 mg daily  - exercise cardiolite and echo  - titrate statin therapy  - add BB therapy    LE swelling L>R.  Venous duplex    Hyperlipidemia-titrate atorvastatin to 80mg/d.  Ldl 71 on 40mg    Hypertension, elevated  - cont amlodipine to 10mg daily  - cont hctz  - add BB for anti-anginal therapy      Primary Care Physician- Milind Rodriguez MD    Follow-up after completion of stress testing      Subjective:    Developed substernal chest tightness with daily walk.  Starts after 15-20 mins.  Gradually subsides during walk.  Walks 20K steps daily        Past Medical History:   Diagnosis Date    Cancer (HCC)     melanoma.     Coronary artery disease 1/24/2011    Dyslipidemia     Essential hypertension, benign 1/24/2011        Past Surgical History:   Procedure Laterality Date    COLONOSCOPY N/A 5/13/2022    COLONOSCOPY performed by Min Tan MD at [facility] ENDOSCOPY    HX CORONARY ARTERY BYPASS GRAFT  1998    HX KNEE REPLACEMENT Bilateral     left and right knee replacement.          Current Outpatient Medications:     hydroCHLOROthiazide (HYDRODIURIL) 25 mg tablet, TAKE ONE TABLET BY MOUTH DAILY, Disp: 90 Tablet, Rfl: 0    amLODIPine (NORVASC) 10 mg tablet, Take 1 Tablet by mouth daily., Disp: 90 Tablet, Rfl: 3    atorvastatin (LIPITOR) 40 mg tablet, Take 1 Tablet by mouth daily., Disp: 90 Tablet, Rfl: 3    albuterol (PROVENTIL HFA, VENTOLIN HFA, PROAIR HFA) 90 mcg/actuation inhaler, Take 2 Puffs by inhalation as needed., Disp: , Rfl:     aspirin 81 mg chewable tablet, Take 1 Tab  Left message to call back

## 2024-05-20 NOTE — TELEPHONE ENCOUNTER
Patient contacted central scheduling to make an ultrasound appointment. There is not an order in place at this time. Please contact patient to advise.

## 2024-05-30 ENCOUNTER — TELEPHONE (OUTPATIENT)
Dept: OBGYN CLINIC | Facility: CLINIC | Age: 29
End: 2024-05-30

## 2024-05-30 DIAGNOSIS — Z3A.20 20 WEEKS GESTATION OF PREGNANCY (HCC): ICD-10-CM

## 2024-05-30 DIAGNOSIS — Z34.02 ENCOUNTER FOR SUPERVISION OF NORMAL FIRST PREGNANCY IN SECOND TRIMESTER (HCC): Primary | ICD-10-CM

## 2024-05-30 NOTE — TELEPHONE ENCOUNTER
Patient is 15w2d, c/o a stretching pain to the left and right of the vagina that started last week. Patient states it hurts when she moves. Feels muscular. Patient states it is not unbearable. She has not taken any tylenol as she is trying to avoid pain killers. Patient denies vaginal bleeding. Informed patient this sounds like round ligament pain and it is common at this gestational age. Patient advised to try tylenol and a warm bath. Patient agrees and will call in a few days if it worsens.     Patient also requesting an order for 20 week ultrasound. Patient stats Dr. Ruiz told her at last visit to schedule it as soon as she can because often the ultrasound department is backed up. Informed patient she will receive order at her 6/12 appointment. Patient requesting the order now so she can make an appointment. Will send to on call for review.

## 2024-06-12 ENCOUNTER — ROUTINE PRENATAL (OUTPATIENT)
Dept: OBGYN CLINIC | Facility: CLINIC | Age: 29
End: 2024-06-12
Payer: COMMERCIAL

## 2024-06-12 VITALS
DIASTOLIC BLOOD PRESSURE: 85 MMHG | HEART RATE: 109 BPM | SYSTOLIC BLOOD PRESSURE: 125 MMHG | BODY MASS INDEX: 28 KG/M2 | WEIGHT: 162.81 LBS

## 2024-06-12 DIAGNOSIS — Z34.81 ENCOUNTER FOR SUPERVISION OF OTHER NORMAL PREGNANCY IN FIRST TRIMESTER (HCC): Primary | ICD-10-CM

## 2024-06-12 PROBLEM — R71.8 RBC MICROCYTOSIS: Status: ACTIVE | Noted: 2024-06-12

## 2024-06-12 LAB
APPEARANCE: CLEAR
BILIRUBIN: NEGATIVE
GLUCOSE (URINE DIPSTICK): NEGATIVE MG/DL
KETONES (URINE DIPSTICK): NEGATIVE MG/DL
MULTISTIX LOT#: ABNORMAL NUMERIC
NITRITE, URINE: NEGATIVE
OCCULT BLOOD: NEGATIVE
PH, URINE: 6 (ref 4.5–8)
SPECIFIC GRAVITY: 1.01 (ref 1–1.03)
URINE-COLOR: YELLOW
UROBILINOGEN,SEMI-QN: 0.2 MG/DL (ref 0–1.9)

## 2024-06-12 PROCEDURE — 81002 URINALYSIS NONAUTO W/O SCOPE: CPT | Performed by: OBSTETRICS & GYNECOLOGY

## 2024-06-13 NOTE — PROGRESS NOTES
Alex at visit. She c/o pain consistent with ligament. Also some intermittent plantar pain. Guidance given. Has level 1 scheduled in July.

## 2024-07-08 ENCOUNTER — HOSPITAL ENCOUNTER (OUTPATIENT)
Dept: ULTRASOUND IMAGING | Facility: HOSPITAL | Age: 29
Discharge: HOME | End: 2024-07-08
Attending: OBSTETRICS & GYNECOLOGY
Payer: COMMERCIAL

## 2024-07-08 DIAGNOSIS — Z34.02 ENCOUNTER FOR SUPERVISION OF NORMAL FIRST PREGNANCY IN SECOND TRIMESTER (HCC): ICD-10-CM

## 2024-07-08 DIAGNOSIS — Z3A.20 20 WEEKS GESTATION OF PREGNANCY (HCC): ICD-10-CM

## 2024-07-08 PROCEDURE — 76805 OB US >/= 14 WKS SNGL FETUS: CPT | Performed by: OBSTETRICS & GYNECOLOGY

## 2024-07-12 ENCOUNTER — LAB ENCOUNTER (OUTPATIENT)
Dept: LAB | Facility: HOSPITAL | Age: 29
End: 2024-07-12
Attending: OBSTETRICS & GYNECOLOGY
Payer: COMMERCIAL

## 2024-07-12 DIAGNOSIS — Z34.81 ENCOUNTER FOR SUPERVISION OF OTHER NORMAL PREGNANCY IN FIRST TRIMESTER (HCC): ICD-10-CM

## 2024-07-12 LAB
DEPRECATED HBV CORE AB SER IA-ACNC: 32.1 NG/ML
IRON SATN MFR SERPL: 25 %
IRON SERPL-MCNC: 107 UG/DL
TIBC SERPL-MCNC: 426 UG/DL (ref 250–425)
TRANSFERRIN SERPL-MCNC: 286 MG/DL (ref 250–380)

## 2024-07-12 PROCEDURE — 84466 ASSAY OF TRANSFERRIN: CPT | Performed by: OBSTETRICS & GYNECOLOGY

## 2024-07-12 PROCEDURE — 83540 ASSAY OF IRON: CPT | Performed by: OBSTETRICS & GYNECOLOGY

## 2024-07-12 PROCEDURE — 36415 COLL VENOUS BLD VENIPUNCTURE: CPT

## 2024-07-12 PROCEDURE — 83021 HEMOGLOBIN CHROMOTOGRAPHY: CPT

## 2024-07-12 PROCEDURE — 82728 ASSAY OF FERRITIN: CPT | Performed by: OBSTETRICS & GYNECOLOGY

## 2024-07-12 PROCEDURE — 83020 HEMOGLOBIN ELECTROPHORESIS: CPT

## 2024-07-15 ENCOUNTER — ROUTINE PRENATAL (OUTPATIENT)
Dept: OBGYN CLINIC | Facility: CLINIC | Age: 29
End: 2024-07-15

## 2024-07-15 VITALS
DIASTOLIC BLOOD PRESSURE: 79 MMHG | SYSTOLIC BLOOD PRESSURE: 106 MMHG | BODY MASS INDEX: 29 KG/M2 | WEIGHT: 169.81 LBS | HEART RATE: 109 BPM

## 2024-07-15 DIAGNOSIS — Z34.92 ENCOUNTER FOR SUPERVISION OF NORMAL PREGNANCY IN SECOND TRIMESTER, UNSPECIFIED GRAVIDITY (HCC): Primary | ICD-10-CM

## 2024-07-15 LAB
APPEARANCE: CLEAR
BILIRUBIN: NEGATIVE
GLUCOSE (URINE DIPSTICK): NEGATIVE MG/DL
KETONES (URINE DIPSTICK): NEGATIVE MG/DL
LEUKOCYTES: NEGATIVE
MULTISTIX LOT#: NORMAL NUMERIC
NITRITE, URINE: NEGATIVE
OCCULT BLOOD: NEGATIVE
PH, URINE: 6 (ref 4.5–8)
PROTEIN (URINE DIPSTICK): NEGATIVE MG/DL
SPECIFIC GRAVITY: 1.01 (ref 1–1.03)
URINE-COLOR: YELLOW
UROBILINOGEN,SEMI-QN: 0.2 MG/DL (ref 0–1.9)

## 2024-07-15 PROCEDURE — 81002 URINALYSIS NONAUTO W/O SCOPE: CPT | Performed by: OBSTETRICS & GYNECOLOGY

## 2024-07-18 LAB
HGB A2 MFR BLD: 4.8 % (ref 1.5–3.5)
HGB F MFR BLD: 5.6 % (ref 0–2)
HGB PNL BLD ELPH: 89.6 % (ref 95.5–100)

## 2024-07-19 ENCOUNTER — TELEPHONE (OUTPATIENT)
Dept: HEMATOLOGY/ONCOLOGY | Facility: HOSPITAL | Age: 29
End: 2024-07-19

## 2024-07-19 ENCOUNTER — TELEPHONE (OUTPATIENT)
Dept: OBGYN CLINIC | Facility: CLINIC | Age: 29
End: 2024-07-19

## 2024-07-19 DIAGNOSIS — D58.2 HIGH BLOOD HEMOGLOBIN F (HCC): Primary | ICD-10-CM

## 2024-07-19 DIAGNOSIS — D56.3 BETA THALASSEMIA TRAIT: ICD-10-CM

## 2024-07-19 NOTE — TELEPHONE ENCOUNTER
New consult for   High blood hemoglobin F (HCC) [D58.2]  Beta thalassemia trait [D56.3] referred by Dr. EDUARDO Ruiz. Called 7/19/24

## 2024-07-19 NOTE — TELEPHONE ENCOUNTER
22w3d patient notified referral placed. She states she was not informed of these results during visit.   Advised elevated hemoglobin F can be related to beta thalassemia trait and Hematology will let her know if any further action needed. Patient will let us know if not able to be added in next 1-2 weeks.

## 2024-07-19 NOTE — TELEPHONE ENCOUNTER
Please refer to Dr Green in hematology asap- abnormal hemoglobin electrophoresis with elevated hemoglobin F and beta thalassemia trait.please inform patient, thanks.

## 2024-07-19 NOTE — PROGRESS NOTES
GTT next visit, 20 week ultrasound normal, abnormal hemoglobin electrophoresis- c/w beta thalassemia trait but hemoglobin F is also elevated- will send for hematology consult.

## 2024-07-25 ENCOUNTER — OFFICE VISIT (OUTPATIENT)
Dept: HEMATOLOGY/ONCOLOGY | Facility: HOSPITAL | Age: 29
End: 2024-07-25
Attending: INTERNAL MEDICINE
Payer: COMMERCIAL

## 2024-07-25 VITALS
HEIGHT: 64 IN | TEMPERATURE: 99 F | HEART RATE: 111 BPM | BODY MASS INDEX: 29.71 KG/M2 | WEIGHT: 174 LBS | RESPIRATION RATE: 16 BRPM | SYSTOLIC BLOOD PRESSURE: 113 MMHG | OXYGEN SATURATION: 99 % | DIASTOLIC BLOOD PRESSURE: 70 MMHG

## 2024-07-25 DIAGNOSIS — D50.9 MICROCYTIC ANEMIA: ICD-10-CM

## 2024-07-25 DIAGNOSIS — D56.3 BETA THALASSEMIA TRAIT: Primary | ICD-10-CM

## 2024-07-25 DIAGNOSIS — E53.8 VITAMIN B12 DEFICIENCY: ICD-10-CM

## 2024-07-25 DIAGNOSIS — D58.2 HIGH BLOOD HEMOGLOBIN F (HCC): ICD-10-CM

## 2024-07-25 DIAGNOSIS — Z34.92 SECOND TRIMESTER PREGNANCY (HCC): ICD-10-CM

## 2024-07-25 LAB
ALBUMIN SERPL-MCNC: 4.2 G/DL (ref 3.2–4.8)
ALBUMIN/GLOB SERPL: 1.6 {RATIO} (ref 1–2)
ALP LIVER SERPL-CCNC: 115 U/L
ALT SERPL-CCNC: 12 U/L
ANION GAP SERPL CALC-SCNC: 9 MMOL/L (ref 0–18)
AST SERPL-CCNC: 20 U/L (ref ?–34)
BASOPHILS # BLD AUTO: 0.02 X10(3) UL (ref 0–0.2)
BASOPHILS NFR BLD AUTO: 0.2 %
BILIRUB SERPL-MCNC: 0.5 MG/DL (ref 0.3–1.2)
BUN BLD-MCNC: 9 MG/DL (ref 9–23)
BUN/CREAT SERPL: 18 (ref 10–20)
CALCIUM BLD-MCNC: 8.9 MG/DL (ref 8.7–10.4)
CHLORIDE SERPL-SCNC: 107 MMOL/L (ref 98–112)
CO2 SERPL-SCNC: 22 MMOL/L (ref 21–32)
CREAT BLD-MCNC: 0.5 MG/DL
DEPRECATED RDW RBC AUTO: 46 FL (ref 35.1–46.3)
EGFRCR SERPLBLD CKD-EPI 2021: 130 ML/MIN/1.73M2 (ref 60–?)
EOSINOPHIL # BLD AUTO: 0.03 X10(3) UL (ref 0–0.7)
EOSINOPHIL NFR BLD AUTO: 0.3 %
ERYTHROCYTE [DISTWIDTH] IN BLOOD BY AUTOMATED COUNT: 20.1 % (ref 11–15)
FASTING STATUS PATIENT QL REPORTED: NO
FOLATE SERPL-MCNC: >24 NG/ML (ref 5.4–?)
GLOBULIN PLAS-MCNC: 2.7 G/DL (ref 2–3.5)
GLUCOSE BLD-MCNC: 85 MG/DL (ref 70–99)
HCT VFR BLD AUTO: 28.7 %
HGB BLD-MCNC: 9.3 G/DL
HGB RETIC QN AUTO: 22.8 PG (ref 28.2–36.6)
IMM GRANULOCYTES # BLD AUTO: 0.14 X10(3) UL (ref 0–1)
IMM GRANULOCYTES NFR BLD: 1.5 %
IMM RETICS NFR: 0.44 RATIO (ref 0.1–0.3)
LDH SERPL L TO P-CCNC: 158 U/L
LYMPHOCYTES # BLD AUTO: 1.74 X10(3) UL (ref 1–4)
LYMPHOCYTES NFR BLD AUTO: 18.7 %
MCH RBC QN AUTO: 22.3 PG (ref 26–34)
MCHC RBC AUTO-ENTMCNC: 32.4 G/DL (ref 31–37)
MCV RBC AUTO: 68.8 FL
MONOCYTES # BLD AUTO: 0.6 X10(3) UL (ref 0.1–1)
MONOCYTES NFR BLD AUTO: 6.5 %
NEUTROPHILS # BLD AUTO: 6.77 X10 (3) UL (ref 1.5–7.7)
NEUTROPHILS # BLD AUTO: 6.77 X10(3) UL (ref 1.5–7.7)
NEUTROPHILS NFR BLD AUTO: 72.8 %
OSMOLALITY SERPL CALC.SUM OF ELEC: 284 MOSM/KG (ref 275–295)
PLATELET # BLD AUTO: 292 10(3)UL (ref 150–450)
POTASSIUM SERPL-SCNC: 3.6 MMOL/L (ref 3.5–5.1)
PROT SERPL-MCNC: 6.9 G/DL (ref 5.7–8.2)
RBC # BLD AUTO: 4.17 X10(6)UL
RETICS # AUTO: 178.9 X10(3) UL (ref 22.5–147.5)
RETICS/RBC NFR AUTO: 4.3 %
SODIUM SERPL-SCNC: 138 MMOL/L (ref 136–145)
VIT B12 SERPL-MCNC: 198 PG/ML (ref 211–911)
WBC # BLD AUTO: 9.3 X10(3) UL (ref 4–11)

## 2024-07-25 PROCEDURE — 99204 OFFICE O/P NEW MOD 45 MIN: CPT | Performed by: INTERNAL MEDICINE

## 2024-07-25 RX ORDER — MELATONIN
1000 DAILY
Qty: 30 TABLET | Refills: 2 | Status: SHIPPED | OUTPATIENT
Start: 2024-07-25

## 2024-07-25 NOTE — PROGRESS NOTES
Hematology Consultation Note    Patient Name: Mamta Naqvi   YOB: 1995   Medical Record Number: [id number]   CSN: [id number]   Consulting Physician: Mesfin Green MD  Referring Physician(s): Cecilia Ruiz MD  Date of Consultation: 2024     Reason for Consultation:  Elevated hgb F, beta-thal trait, 2nd trimester pregnancy     History of Present Illness:     Mamta Naqvi is a 29 year old female that was seen today in the Hematology suite for evaluation of the above condition. Patient has no PMH. She is  at 23 weeks and 2 days gestation at the time of consultation.  Patient is being evaluated for elevated hemoglobin F levels, beta thalassemia trait she is currently in second trimester pregnancy.  Patient reports pregnancy has been going well.  She denies bleeding or bruising complications during pregnancy.  No reports of gestational diabetes and she reports she has been gaining weight appropriately for pregnancy and no signs of HTN.  Patient has no systemic signs of illness or other concerns on ROS.  Patient underwent hemoglobin after pheresis in light of macrocytic anemia 2024.  Lab results show hemoglobin F at 8.6% with decreased value of hemoglobin A and an increased value of hemoglobin A2; findings consistent with beta thalassemia trait.     Past Medical History:  Past Medical History:    History of varicella       Past Surgical History:  Past Surgical History:   Procedure Laterality Date    Other surgical history      D&C x 2 per pt in ; no bleeding complications       Family Medical History:  Family History   Problem Relation Age of Onset    Diabetes Mother         Type 2    Bleeding Disorders Neg     DVT/VTE Neg     Cancer Neg        Social History:  Social History     Socioeconomic History    Marital status:      Spouse name: Not on file    Number of children: Not on file    Years of education: Not on file    Highest education level: Not on file   Occupational  History    Not on file   Tobacco Use    Smoking status: Never    Smokeless tobacco: Never   Vaping Use    Vaping status: Never Used   Substance and Sexual Activity    Alcohol use: Never    Drug use: Never    Sexual activity: Not on file   Other Topics Concern    Not on file   Social History Narrative    Mamta is . She has no children. Patient works as a . She lives with her  in [place], [place].     Social Determinants of Health     Financial Resource Strain: Not on file   Food Insecurity: Not on file   Transportation Needs: Not on file   Stress: Not on file   Housing Stability: Not on file       Allergies:   No Known Allergies    Current Medications:   cyanocobalamin 1000 MCG Oral Tab Take 1 tablet (1,000 mcg total) by mouth daily. 30 tablet 2    prenatal vitamin with DHA 27-0.8-228 MG Oral Cap Take 1 capsule by mouth daily.         Review of Systems:    Constitutional: Negative for anorexia, chills, fatigue, fevers, night sweats and weight loss.  Eyes: Negative for visual disturbance, irritation and redness.  Respiratory: Negative for cough, hemoptysis, chest pain, or dyspnea on exertion.  Gastrointestinal: Negative for dysphagia, odynophagia, reflux symptoms, nausea, vomiting, change in bowel habits, diarrhea, constipation and abdominal pain.  Integument/breast: Negative for rash, skin lesions, and pruritus.  Hematologic/lymphatic: Negative for easy bruising, bleeding, and lymphadenopathy.  Musculoskeletal: Negative for myalgias, arthralgias, muscle weakness.  Neurological: Negative for headaches, dizziness, speech problems, gait problems and weakness.    A comprehensive 14 point review of systems was completed.  Pertinent positives and negatives noted in the the HPI.     Vital Signs:  /70 (BP Location: Left arm, Patient Position: Sitting, Cuff Size: adult)   Pulse 111   Temp 98.6 °F (37 °C) (Oral)   Resp 16   Ht 1.626 m (5' 4\")   Wt 78.9 kg (174 lb)   LMP 02/13/2024 (Exact  Date)   SpO2 99%   BMI 29.87 kg/m²     Physical Examination:    General: Patient is alert and oriented x 3, not in acute distress.  Psych: Friendly, cooperative with appropriate questions and responses  HEENT: EOMs intact. Oropharynx is clear.   Neck:  No palpable lymphadenopathy. Neck is supple.  Lymphatics: There is no palpable lymphadenopathy throughout in the cervical, supraclavicular, axillary, or inguinal regions.  Chest: Clear to auscultation. No wheezes or rales.  Heart: Regular rate and rhythm. S1S2 normal.  Abdomen: Gravidas abdomen appropriate for gestational age  Extremities: No edema or calf tenderness.  Neurological: Grossly intact.      Labs:    Lab Results   Component Value Date/Time    WBC 9.3 07/25/2024 01:22 PM    RBC 4.17 07/25/2024 01:22 PM    HGB 9.3 (L) 07/25/2024 01:22 PM    HCT 28.7 (L) 07/25/2024 01:22 PM    MCV 68.8 (L) 07/25/2024 01:22 PM    MCH 22.3 (L) 07/25/2024 01:22 PM    MCHC 32.4 07/25/2024 01:22 PM    RDW 20.1 (H) 07/25/2024 01:22 PM    NEPRELIM 6.77 07/25/2024 01:22 PM    .0 07/25/2024 01:22 PM       Lab Results   Component Value Date/Time    GLU 85 07/25/2024 01:22 PM    BUN 9 07/25/2024 01:22 PM    CREATSERUM 0.50 (L) 07/25/2024 01:22 PM    GFRNAA 121 05/12/2022 11:49 AM    CA 8.9 07/25/2024 01:22 PM    ALB 4.2 07/25/2024 01:22 PM     07/25/2024 01:22 PM    K 3.6 07/25/2024 01:22 PM     07/25/2024 01:22 PM    CO2 22.0 07/25/2024 01:22 PM    ALKPHO 115 (H) 07/25/2024 01:22 PM    AST 20 07/25/2024 01:22 PM    ALT 12 07/25/2024 01:22 PM         Impression:      ICD-10-CM    1. Beta thalassemia trait  D56.3 CBC With Differential With Platelet     Comp Metabolic Panel (14)     Folic Acid Serum (Folate)     Vitamin B12     CBC With Differential With Platelet     Comp Metabolic Panel (14)     Folic Acid Serum (Folate)     Vitamin B12      2. High blood hemoglobin F (HCC)  D58.2       3. Second trimester pregnancy ([facility])  Z34.92 cyanocobalamin 1000 MCG Oral Tab       4. Microcytic anemia  D50.9 Folic Acid Serum (Folate)     Vitamin B12     Haptoglobin     LDH     Reticulocyte Count     Folic Acid Serum (Folate)     Vitamin B12     Haptoglobin     LDH     Reticulocyte Count     cyanocobalamin 1000 MCG Oral Tab      5. Vitamin B12 deficiency  E53.8 cyanocobalamin 1000 MCG Oral Tab        29 year old W with no PMH presents for an evaluation of beta thalassemia trait with resulting elevated hemoglobin F value    Plan:    1.) Elevated hgb F    --her hgb F level at 5.6% is consistent with what we typically see for beta thalassemia trait patients as they can typically values between 1 to 10%; this pt has beta thal, so this is an expected finding  --no evidence of her having aplastic anemia as her CBC+diff today only shows microcytic anemia  --this may also be a finding in pregnant patients as there is contamination with fetal-maternal bleeding; pt denies any bleeding    2.) Beta thalassemia trait    --This is a benign hereditary carrier condition which typically does not cause any hemolysis  --this will cause microcytosis which can be confusing for iron deficiency anemia    3.) Microcytic Anemia in 2nd trimester pregnancy    -- Patient does not have iron deficiency anemia.    --B12 levels are low so starting her on B12 replacement therapy and patient will follow-up in 4 wks  --no other types of anemia noted; no signs of hemolysis present    MDM: Moderate Risk    Mesfin Green MD  [facility]  [facility]  [address] [place] [id number]

## 2024-07-26 ENCOUNTER — TELEPHONE (OUTPATIENT)
Dept: OBGYN CLINIC | Facility: CLINIC | Age: 29
End: 2024-07-26

## 2024-07-26 LAB — HAPTOGLOB SERPL-MCNC: 99 MG/DL (ref 30–200)

## 2024-07-26 NOTE — TELEPHONE ENCOUNTER
23w3d patient calling to report decreased fetal movement x 2-3 days. Initially she states she has not felt anything x 2-3 days, but then said she felt some kind of \"activity\" on left side of abdomen, but unsure if fetal movement or not. Per 7/8 ultrasound report, she does have anterior placenta. Denies leakage of fluid, vaginal bleeding or cramping.     Discussed how anterior placenta can impact how patient feels fetal movement. Advised baby can have periods of sleep or change position. Reassured movement of any caliber is also considered normal, so should monitor for smaller movements. Informed our office does not monitor fetal movement until closer to 28 weeks. I did give instructions for kick count for her own reassurance as well. Patient to call if further concern.     To Dr. Howard on call for any further recommendations.

## 2024-08-06 ENCOUNTER — ROUTINE PRENATAL (OUTPATIENT)
Dept: OBGYN CLINIC | Facility: CLINIC | Age: 29
End: 2024-08-06

## 2024-08-06 VITALS
HEART RATE: 105 BPM | BODY MASS INDEX: 30 KG/M2 | DIASTOLIC BLOOD PRESSURE: 74 MMHG | WEIGHT: 177.19 LBS | SYSTOLIC BLOOD PRESSURE: 111 MMHG

## 2024-08-06 DIAGNOSIS — Z34.91 ENCOUNTER FOR SUPERVISION OF NORMAL PREGNANCY IN FIRST TRIMESTER, UNSPECIFIED GRAVIDITY (HCC): Primary | ICD-10-CM

## 2024-08-06 LAB
BILIRUBIN: NEGATIVE
GLUCOSE (URINE DIPSTICK): NEGATIVE MG/DL
KETONES (URINE DIPSTICK): NEGATIVE MG/DL
MULTISTIX LOT#: ABNORMAL NUMERIC
NITRITE, URINE: NEGATIVE
OCCULT BLOOD: NEGATIVE
PH, URINE: 7 (ref 4.5–8)
PROTEIN (URINE DIPSTICK): NEGATIVE MG/DL
SPECIFIC GRAVITY: 1.01 (ref 1–1.03)
URINE-COLOR: YELLOW
UROBILINOGEN,SEMI-QN: 0.2 MG/DL (ref 0–1.9)

## 2024-08-06 PROCEDURE — 81002 URINALYSIS NONAUTO W/O SCOPE: CPT | Performed by: OBSTETRICS & GYNECOLOGY

## 2024-08-21 ENCOUNTER — LAB ENCOUNTER (OUTPATIENT)
Dept: LAB | Facility: REFERENCE LAB | Age: 29
End: 2024-08-21
Attending: OBSTETRICS & GYNECOLOGY
Payer: COMMERCIAL

## 2024-08-21 ENCOUNTER — ROUTINE PRENATAL (OUTPATIENT)
Dept: OBGYN CLINIC | Facility: CLINIC | Age: 29
End: 2024-08-21

## 2024-08-21 VITALS
WEIGHT: 182 LBS | DIASTOLIC BLOOD PRESSURE: 77 MMHG | BODY MASS INDEX: 31 KG/M2 | HEART RATE: 111 BPM | SYSTOLIC BLOOD PRESSURE: 111 MMHG

## 2024-08-21 DIAGNOSIS — Z34.92 ENCOUNTER FOR SUPERVISION OF NORMAL PREGNANCY IN SECOND TRIMESTER, UNSPECIFIED GRAVIDITY (HCC): Primary | ICD-10-CM

## 2024-08-21 DIAGNOSIS — O99.810 ABNORMAL MATERNAL GLUCOSE TOLERANCE, ANTEPARTUM (HCC): ICD-10-CM

## 2024-08-21 LAB
APPEARANCE: CLEAR
BILIRUBIN: NEGATIVE
DEPRECATED RDW RBC AUTO: 45.3 FL (ref 35.1–46.3)
ERYTHROCYTE [DISTWIDTH] IN BLOOD BY AUTOMATED COUNT: 19.2 % (ref 11–15)
GLUCOSE (URINE DIPSTICK): NEGATIVE MG/DL
GLUCOSE 1H P GLC SERPL-MCNC: 158 MG/DL
HCT VFR BLD AUTO: 29.4 %
HGB BLD-MCNC: 9.4 G/DL
KETONES (URINE DIPSTICK): NEGATIVE MG/DL
LEUKOCYTES: NEGATIVE
MCH RBC QN AUTO: 22.5 PG (ref 26–34)
MCHC RBC AUTO-ENTMCNC: 32 G/DL (ref 31–37)
MCV RBC AUTO: 70.3 FL
MULTISTIX LOT#: NORMAL NUMERIC
NITRITE, URINE: NEGATIVE
OCCULT BLOOD: NEGATIVE
PH, URINE: 6.5 (ref 4.5–8)
PLATELET # BLD AUTO: 291 10(3)UL (ref 150–450)
PROTEIN (URINE DIPSTICK): NEGATIVE MG/DL
RBC # BLD AUTO: 4.18 X10(6)UL
SPECIFIC GRAVITY: 1.01 (ref 1–1.03)
URINE-COLOR: YELLOW
UROBILINOGEN,SEMI-QN: 0.2 MG/DL (ref 0–1.9)
WBC # BLD AUTO: 9 X10(3) UL (ref 4–11)

## 2024-08-21 PROCEDURE — 82950 GLUCOSE TEST: CPT | Performed by: OBSTETRICS & GYNECOLOGY

## 2024-08-21 PROCEDURE — 36415 COLL VENOUS BLD VENIPUNCTURE: CPT | Performed by: OBSTETRICS & GYNECOLOGY

## 2024-08-21 PROCEDURE — 85027 COMPLETE CBC AUTOMATED: CPT | Performed by: OBSTETRICS & GYNECOLOGY

## 2024-08-21 PROCEDURE — 81002 URINALYSIS NONAUTO W/O SCOPE: CPT | Performed by: OBSTETRICS & GYNECOLOGY

## 2024-08-21 PROCEDURE — 82728 ASSAY OF FERRITIN: CPT | Performed by: OBSTETRICS & GYNECOLOGY

## 2024-08-23 ENCOUNTER — OFFICE VISIT (OUTPATIENT)
Dept: HEMATOLOGY/ONCOLOGY | Facility: HOSPITAL | Age: 29
End: 2024-08-23
Attending: INTERNAL MEDICINE
Payer: COMMERCIAL

## 2024-08-23 VITALS
RESPIRATION RATE: 16 BRPM | BODY MASS INDEX: 31.07 KG/M2 | WEIGHT: 182 LBS | TEMPERATURE: 98 F | DIASTOLIC BLOOD PRESSURE: 59 MMHG | SYSTOLIC BLOOD PRESSURE: 118 MMHG | OXYGEN SATURATION: 99 % | HEART RATE: 119 BPM | HEIGHT: 64 IN

## 2024-08-23 DIAGNOSIS — E53.8 VITAMIN B12 DEFICIENCY: ICD-10-CM

## 2024-08-23 DIAGNOSIS — Z34.92 SECOND TRIMESTER PREGNANCY (HCC): Primary | ICD-10-CM

## 2024-08-23 DIAGNOSIS — D50.8 OTHER IRON DEFICIENCY ANEMIA: ICD-10-CM

## 2024-08-23 DIAGNOSIS — D50.9 MICROCYTIC ANEMIA: ICD-10-CM

## 2024-08-23 LAB — DEPRECATED HBV CORE AB SER IA-ACNC: 24.1 NG/ML

## 2024-08-23 PROCEDURE — 99214 OFFICE O/P EST MOD 30 MIN: CPT | Performed by: INTERNAL MEDICINE

## 2024-08-23 RX ORDER — FERROUS SULFATE 325(65) MG
325 TABLET ORAL
Qty: 30 TABLET | Refills: 2 | Status: SHIPPED | OUTPATIENT
Start: 2024-08-23

## 2024-08-23 NOTE — PROGRESS NOTES
Hematology Progress Note    Patient Name: Mamta Naqvi   YOB: 1995   Medical Record Number: [id number]   CSN: [id number]   Consulting Physician: Mesfin Green MD  Referring Physician(s): Cecilia Ruiz MD  Date of Visit: 2024     Chief Complaint:  Elevated hgb F, beta-thal trait, 2nd trimester pregnancy, B12 deficiency     History of Present Illness:     Mamta Naqvi is a 29 year old female that was seen today in the Hematology suite for evaluation of the above condition. Patient has no PMH. She is  at 23 weeks and 2 days gestation at the time of consultation.  Patient is being evaluated for elevated hemoglobin F levels, beta thalassemia trait she is currently in second trimester pregnancy.  Patient reports pregnancy has been going well.  She denies bleeding or bruising complications during pregnancy.  No reports of gestational diabetes and she reports she has been gaining weight appropriately for pregnancy and no signs of HTN.  Patient has no systemic signs of illness or other concerns on ROS.  Patient underwent hemoglobin after pheresis in light of macrocytic anemia 2024.  Lab results show hemoglobin F at 8.6% with decreased value of hemoglobin A and an increased value of hemoglobin A2; findings consistent with beta thalassemia trait.     Interval History:    Mamta is now at 27wks and 3 days of pregnancy. She denies any new complications with pregnancy w/ no reports of blood loss during pregnancy. Pt has no new concerns on ROS.    Past Medical History:  Past Medical History:    History of varicella       Past Surgical History:  Past Surgical History:   Procedure Laterality Date    Other surgical history      D&C x 2 per pt in ; no bleeding complications       Family Medical History:  Family History   Problem Relation Age of Onset    Diabetes Mother         Type 2    Bleeding Disorders Neg     DVT/VTE Neg     Cancer Neg        Social History:  Social History      Socioeconomic History    Marital status:      Spouse name: Not on file    Number of children: Not on file    Years of education: Not on file    Highest education level: Not on file   Occupational History    Not on file   Tobacco Use    Smoking status: Never    Smokeless tobacco: Never   Vaping Use    Vaping status: Never Used   Substance and Sexual Activity    Alcohol use: Never    Drug use: Never    Sexual activity: Not on file   Other Topics Concern    Not on file   Social History Narrative    Mamta is . She has no children. Patient works as a . She lives with her  in [place], [place].     Social Determinants of Health     Financial Resource Strain: Not on file   Food Insecurity: Not on file   Transportation Needs: Not on file   Stress: Not on file   Housing Stability: Not on file       Allergies:   No Known Allergies    Current Medications:   cyanocobalamin 1000 MCG Oral Tab Take 1 tablet (1,000 mcg total) by mouth daily. 30 tablet 2    prenatal vitamin with DHA 27-0.8-228 MG Oral Cap Take 1 capsule by mouth daily.         Review of Systems:    Constitutional: Negative for anorexia, chills, fatigue, fevers, night sweats and weight loss.  Eyes: Negative for visual disturbance, irritation and redness.  Respiratory: Negative for cough, hemoptysis, chest pain, or dyspnea on exertion.  Gastrointestinal: Negative for dysphagia, odynophagia, reflux symptoms, nausea, vomiting, change in bowel habits, diarrhea, constipation and abdominal pain.  Integument/breast: Negative for rash, skin lesions, and pruritus.  Hematologic/lymphatic: Negative for easy bruising, bleeding, and lymphadenopathy.  Musculoskeletal: Negative for myalgias, arthralgias, muscle weakness.  Neurological: Negative for headaches, dizziness, speech problems, gait problems and weakness.    A comprehensive 14 point review of systems was completed.  Pertinent positives and negatives noted in the the HPI.     Vital  Signs:  /59 (BP Location: Right arm, Patient Position: Sitting, Cuff Size: adult)   Pulse 119   Temp 98.4 °F (36.9 °C) (Oral)   Resp 16   Ht 1.626 m (5' 4\")   Wt 82.6 kg (182 lb)   LMP 02/13/2024 (Exact Date)   SpO2 99%   BMI 31.24 kg/m²     Physical Examination:    General: Patient is alert and oriented x 3, not in acute distress.  Psych: Friendly, cooperative with appropriate questions and responses  HEENT: EOMs intact. Oropharynx is clear.   Neck:  No palpable lymphadenopathy. Neck is supple.  Lymphatics: There is no palpable lymphadenopathy throughout in the cervical, supraclavicular, axillary, or inguinal regions.  Chest: Clear to auscultation. No wheezes or rales.  Heart: Regular rate and rhythm. S1S2 normal.  Abdomen: Gravidas abdomen appropriate for gestational age  Extremities: No edema or calf tenderness.  Neurological: Grossly intact.      Labs:    Lab Results   Component Value Date/Time    WBC 9.0 08/21/2024 08:47 AM    RBC 4.18 08/21/2024 08:47 AM    HGB 9.4 (L) 08/21/2024 08:47 AM    HCT 29.4 (L) 08/21/2024 08:47 AM    MCV 70.3 (L) 08/21/2024 08:47 AM    MCH 22.5 (L) 08/21/2024 08:47 AM    MCHC 32.0 08/21/2024 08:47 AM    RDW 19.2 (H) 08/21/2024 08:47 AM    NEPRELIM 6.77 07/25/2024 01:22 PM    .0 08/21/2024 08:47 AM       Lab Results   Component Value Date/Time    GLU 85 07/25/2024 01:22 PM    BUN 9 07/25/2024 01:22 PM    CREATSERUM 0.50 (L) 07/25/2024 01:22 PM    GFRNAA 121 05/12/2022 11:49 AM    CA 8.9 07/25/2024 01:22 PM    ALB 4.2 07/25/2024 01:22 PM     07/25/2024 01:22 PM    K 3.6 07/25/2024 01:22 PM     07/25/2024 01:22 PM    CO2 22.0 07/25/2024 01:22 PM    ALKPHO 115 (H) 07/25/2024 01:22 PM    AST 20 07/25/2024 01:22 PM    ALT 12 07/25/2024 01:22 PM         Impression:    No diagnosis found.    29 year old W with no PMH presents for an evaluation of beta thalassemia trait with resulting elevated hemoglobin F value    Plan:    1.) Elevated hgb F    --her hgb F  level at 5.6% is consistent with what we typically see for beta thalassemia trait patients as they can typically values between 1 to 10%; this pt has beta thal, so this is an expected finding  --no evidence of her having aplastic anemia as her CBC+diff  only shows microcytic anemia  --this may also be a finding in pregnant patients as there is contamination with fetal-maternal bleeding; pt denies any bleeding  --not clinically concerning; f/u as planned    2.) Beta thalassemia trait    --This is a benign hereditary carrier condition which typically does not cause any hemolysis  --this will cause microcytosis which can be confusing for iron deficiency anemia    3.) Microcytic Anemia in 2nd trimester pregnancy    -- Patient's current labs show iron deficiency anemia; starting oral iron supplement daily. She is not interested in IV iron    --B12 levels are low so continue her on B12 replacement therapy and patient will follow-up in 6 wks  --no other types of anemia noted; no signs of hemolysis present    MDM: Moderate Risk    Mesfin Green MD  [facility]  [facility] [address] [address]

## 2024-09-06 ENCOUNTER — TELEPHONE (OUTPATIENT)
Dept: OBGYN CLINIC | Facility: CLINIC | Age: 29
End: 2024-09-06

## 2024-09-06 ENCOUNTER — ROUTINE PRENATAL (OUTPATIENT)
Dept: OBGYN CLINIC | Facility: CLINIC | Age: 29
End: 2024-09-06

## 2024-09-06 ENCOUNTER — LABORATORY ENCOUNTER (OUTPATIENT)
Dept: LAB | Facility: HOSPITAL | Age: 29
End: 2024-09-06
Attending: OBSTETRICS & GYNECOLOGY
Payer: COMMERCIAL

## 2024-09-06 VITALS
HEART RATE: 114 BPM | SYSTOLIC BLOOD PRESSURE: 107 MMHG | BODY MASS INDEX: 31 KG/M2 | WEIGHT: 183.19 LBS | DIASTOLIC BLOOD PRESSURE: 73 MMHG

## 2024-09-06 DIAGNOSIS — O99.810 ABNORMAL MATERNAL GLUCOSE TOLERANCE, ANTEPARTUM (HCC): ICD-10-CM

## 2024-09-06 DIAGNOSIS — O24.419 GESTATIONAL DIABETES MELLITUS (GDM) IN SECOND TRIMESTER, GESTATIONAL DIABETES METHOD OF CONTROL UNSPECIFIED (HCC): ICD-10-CM

## 2024-09-06 DIAGNOSIS — O24.419: Primary | ICD-10-CM

## 2024-09-06 DIAGNOSIS — Z34.91 ENCOUNTER FOR SUPERVISION OF NORMAL PREGNANCY IN FIRST TRIMESTER, UNSPECIFIED GRAVIDITY (HCC): Primary | ICD-10-CM

## 2024-09-06 LAB
APPEARANCE: CLEAR
BILIRUBIN: NEGATIVE
GLUCOSE (URINE DIPSTICK): NEGATIVE MG/DL
GLUCOSE 1H P GLC SERPL-MCNC: 163 MG/DL
GLUCOSE 2H P GLC SERPL-MCNC: 161 MG/DL
GLUCOSE 3H P GLC SERPL-MCNC: 152 MG/DL (ref 70–140)
GLUCOSE P FAST SERPL-MCNC: 82 MG/DL
LEUKOCYTES: NEGATIVE
MULTISTIX LOT#: NORMAL NUMERIC
NITRITE, URINE: NEGATIVE
OCCULT BLOOD: NEGATIVE
PH, URINE: 6 (ref 4.5–8)
PROTEIN (URINE DIPSTICK): NEGATIVE MG/DL
SPECIFIC GRAVITY: 1.01 (ref 1–1.03)
T PALLIDUM AB SER QL IA: NONREACTIVE
URINE-COLOR: YELLOW
UROBILINOGEN,SEMI-QN: 0.2 MG/DL (ref 0–1.9)

## 2024-09-06 PROCEDURE — 82951 GLUCOSE TOLERANCE TEST (GTT): CPT

## 2024-09-06 PROCEDURE — 81002 URINALYSIS NONAUTO W/O SCOPE: CPT | Performed by: OBSTETRICS & GYNECOLOGY

## 2024-09-06 PROCEDURE — 86780 TREPONEMA PALLIDUM: CPT | Performed by: OBSTETRICS & GYNECOLOGY

## 2024-09-06 PROCEDURE — 36415 COLL VENOUS BLD VENIPUNCTURE: CPT | Performed by: OBSTETRICS & GYNECOLOGY

## 2024-09-06 PROCEDURE — 82952 GTT-ADDED SAMPLES: CPT

## 2024-09-06 PROCEDURE — 87389 HIV-1 AG W/HIV-1&-2 AB AG IA: CPT | Performed by: OBSTETRICS & GYNECOLOGY

## 2024-09-06 NOTE — TELEPHONE ENCOUNTER
Noted. DM ed referral order placed.  Patient informed of 3hr gtt results and Dr. Mckinney's recommendations. Patient advised to call central scheduling to schedule an appointment with DM ed. Patient verbalized understanding.

## 2024-09-06 NOTE — PROGRESS NOTES
Feeling well.  Failed 3h gtt.  Msg to RNs to coordinate care.  Seeing heme--Dr Green.  RTC 2 wks.     symptomatic anemia

## 2024-09-09 DIAGNOSIS — D50.8 OTHER IRON DEFICIENCY ANEMIA: ICD-10-CM

## 2024-09-09 DIAGNOSIS — Z34.92 SECOND TRIMESTER PREGNANCY (HCC): ICD-10-CM

## 2024-09-09 DIAGNOSIS — E53.8 VITAMIN B12 DEFICIENCY: Primary | ICD-10-CM

## 2024-09-09 DIAGNOSIS — D50.9 MICROCYTIC ANEMIA: ICD-10-CM

## 2024-09-10 ENCOUNTER — TELEPHONE (OUTPATIENT)
Dept: ENDOCRINOLOGY CLINIC | Facility: CLINIC | Age: 29
End: 2024-09-10

## 2024-09-10 NOTE — TELEPHONE ENCOUNTER
Patient states that she failed 3 hr glucose test and is 30 wks pregnant and was told per Dr. Mckinney to be seen as soon as possible.  Please call.

## 2024-09-10 NOTE — TELEPHONE ENCOUNTER
Patient would need to be seen as soon as possible for evaluation on glucose values.     Please schedule with any provider that has earliest availability (ideally sometime this week).     Will add on all our doctors in our team to see if she can be added on      Thank you!

## 2024-09-13 ENCOUNTER — OFFICE VISIT (OUTPATIENT)
Dept: ENDOCRINOLOGY CLINIC | Facility: CLINIC | Age: 29
End: 2024-09-13

## 2024-09-13 VITALS
HEART RATE: 111 BPM | HEIGHT: 64 IN | WEIGHT: 184.81 LBS | SYSTOLIC BLOOD PRESSURE: 112 MMHG | DIASTOLIC BLOOD PRESSURE: 78 MMHG | BODY MASS INDEX: 31.55 KG/M2

## 2024-09-13 DIAGNOSIS — R73.09 ABNORMAL GTT (GLUCOSE TOLERANCE TEST): Primary | ICD-10-CM

## 2024-09-13 LAB
GLUCOSE BLOOD: 101
HEMOGLOBIN A1C: 5.2 % (ref 4.3–5.6)
TEST STRIP LOT #: NORMAL NUMERIC

## 2024-09-13 PROCEDURE — 83036 HEMOGLOBIN GLYCOSYLATED A1C: CPT | Performed by: INTERNAL MEDICINE

## 2024-09-13 PROCEDURE — 99203 OFFICE O/P NEW LOW 30 MIN: CPT | Performed by: INTERNAL MEDICINE

## 2024-09-13 PROCEDURE — 82947 ASSAY GLUCOSE BLOOD QUANT: CPT | Performed by: INTERNAL MEDICINE

## 2024-09-13 NOTE — H&P
New Patient Evaluation - History and Physical    CONSULT - Reason for Visit:  Abnormal OGTT  Requesting Physician: Dr. Mckinney    CHIEF COMPLAINT:    Abnormal OGTT     HISTORY OF PRESENT ILLNESS:   Mamta Naqvi is a 29 year old female who presents for evaluation of abnormal OGTT  She is 31 weeks pregnant   She had an abnormal OGTT  She is not on any medications for elevated BG  This is her first pregnancy    No prior h/o DM  FH of DM: yes, mother       PAST MEDICAL HISTORY:   Past Medical History:    History of varicella       PAST SURGICAL HISTORY:   Past Surgical History:   Procedure Laterality Date    Other surgical history      D&C x 2 per pt in 2023; no bleeding complications       CURRENT MEDICATIONS:    Current Outpatient Medications   Medication Sig Dispense Refill    Ferrous Sulfate 325 (65 Fe) MG Oral Tab Take 1 tablet (325 mg total) by mouth daily with food. 30 tablet 2    cyanocobalamin 1000 MCG Oral Tab Take 1 tablet (1,000 mcg total) by mouth daily. 30 tablet 2    prenatal vitamin with DHA 27-0.8-228 MG Oral Cap Take 1 capsule by mouth daily.         ALLERGIES:  No Known Allergies    SOCIAL HISTORY:    Social History     Socioeconomic History    Marital status:    Tobacco Use    Smoking status: Never    Smokeless tobacco: Never   Vaping Use    Vaping status: Never Used   Substance and Sexual Activity    Alcohol use: Never    Drug use: Never       FAMILY HISTORY:   Family History   Problem Relation Age of Onset    Diabetes Mother         Type 2    Bleeding Disorders Neg     DVT/VTE Neg     Cancer Neg        ASSESSMENTS:     REVIEW OF SYSTEMS:  Constitutional: Negative for: weight change, fever, fatigue, cold/heat intolerance  Eyes: Negative for:  Visual changes, proptosis, blurring  ENT: Negative for:  dysphagia, neck swelling, dysphonia  Respiratory: Negative for:  dyspnea, cough  Cardiovascular: Negative for:  chest pain, palpitations, orthopnea  GI: Negative for:  abdominal pain, nausea,  vomiting, diarrhea, constipation, bleeding  Neurology: Negative for: headache, numbness, weakness  Genito-Urinary: Negative for: dysuria, frequency  Psychiatric: Negative for:  depression, anxiety  Hematology/Lymphatics: Negative for: bruising, lower extremity edema  Endocrine: Negative for: polyuria, polydypsia  Skin: Negative for: rash, blister, cellulitis,      PHYSICAL EXAM:   Vitals:    09/13/24 1230   BP: 112/78   Pulse: 111   Weight: 184 lb 12.8 oz (83.8 kg)   Height: 5' 4\" (1.626 m)     BMI: Body mass index is 31.72 kg/m².       General Appearance:  alert, well developed, in no acute distress  Head: Atraumatic  Eyes:  normal conjunctivae, sclera., normal sclera and normal pupils  Throat/Neck: normal sound to voice. Normal hearing, normal speech  Respiratory:  Speaking in full sentences, non-labored. no increased work of breathing, no audible wheezing    Neuro: motor grossly intact, moving all extremities without difficulty  Psychiatric:  oriented to time, self, and place  Extremities: no obvious extremity swelling        DATA:     Pertinent data reviewed      ASSESSMENT AND PLAN:      Patient is a 29 year old female with abnormal OGTT    A1 is 5.2 %   Discussed the effects of pregnancy on blood glucose with increased insulin sensitivity (and hence increased risk of hypoglycemia) in the first trimester followed by increase in insulin resistance and hence insulin requirements.  Discussed the effects of hyperglycemia on pregnancy. Patient understands that maternal hyperglycemia increases fetal risk of congenital malformations, macrosomia. Infants can be large for gestational age causing difficulty in delivery and can experience hypoglycemia and hyperbilirubinemia after birth. Maternal complications include increased risk of pre eclampsia, post partum hemorrhage, weight gain.    Management:  For women with gestational DM, the following are recommended as optimal glycemic goals:  Preprandial: <=95 mg/dL and  either:  1-h postmeal: <=140 mg/dL (7.8 mmol/L) or  2-h postmeal: <=120 mg/dL (6.7 mmol/L)  Patient has a normal A1c, although this can be falsely lowered during pregnancy  Discussed BG goals in detail   Discussed dietary changes in detail that can be safely done during pregnancy  For now, she will check Bg at home and update me with sugars on Monday   Discussed that medication will be prescribed if BG are outside goals  Patient has a meter an home and states that she knows how to check her sugars  She will send me her readings Monday   Future screening:  Given h/o GDM she will need an oral glucose tolerance test 6-12 weeks post partum followed by  lifelong screening for the development of diabetes or prediabetes every 1 to  3 years. If she is found to have prediabetes, she will receive lifestyle interventions or metformin to prevent diabetes.    Counselled regarding the importance of weight loss in the post partum period since post partum weight is the greatest predictor of developing DM in the years after pregnancy.        Orders Placed This Encounter   Procedures    POC Glycohemoglobin [86448]    POC HemoCue Glucose 201 (Finger stick glucose)         9/13/2024  Zayda Garcia MD        Patient verbalized a complete  understanding of all of the above and did not have any further questions.

## 2024-09-15 ENCOUNTER — PATIENT MESSAGE (OUTPATIENT)
Dept: ENDOCRINOLOGY CLINIC | Facility: CLINIC | Age: 29
End: 2024-09-15

## 2024-09-16 NOTE — TELEPHONE ENCOUNTER
From: Mamta Naqvi  To: Zayda Garcia  Sent: 9/15/2024 4:12 PM CDT  Subject: Diabetes Readings/Scale    Hello Dr,    I hope you are having a good weekend. I just wanted to reach out to you to recall the numbers you mentioned to me during my appointment. Can you please send me here those numbers? Like the range we are supposed to get between after testing.    Thanks,  Mamta    Removed band-aids and cleansed wound on right elbow with normal saline.       [employer]  04/15/22 2613

## 2024-09-23 ENCOUNTER — TELEPHONE (OUTPATIENT)
Dept: OBGYN CLINIC | Facility: CLINIC | Age: 29
End: 2024-09-23

## 2024-09-23 ENCOUNTER — ROUTINE PRENATAL (OUTPATIENT)
Dept: OBGYN CLINIC | Facility: CLINIC | Age: 29
End: 2024-09-23

## 2024-09-23 VITALS
SYSTOLIC BLOOD PRESSURE: 110 MMHG | WEIGHT: 184 LBS | DIASTOLIC BLOOD PRESSURE: 75 MMHG | BODY MASS INDEX: 32 KG/M2 | HEART RATE: 102 BPM

## 2024-09-23 DIAGNOSIS — Z34.03 ENCOUNTER FOR SUPERVISION OF NORMAL FIRST PREGNANCY IN THIRD TRIMESTER (HCC): Primary | ICD-10-CM

## 2024-09-23 PROCEDURE — 90471 IMMUNIZATION ADMIN: CPT | Performed by: OBSTETRICS & GYNECOLOGY

## 2024-09-23 PROCEDURE — 81002 URINALYSIS NONAUTO W/O SCOPE: CPT | Performed by: OBSTETRICS & GYNECOLOGY

## 2024-09-23 PROCEDURE — 90715 TDAP VACCINE 7 YRS/> IM: CPT | Performed by: OBSTETRICS & GYNECOLOGY

## 2024-09-23 NOTE — TELEPHONE ENCOUNTER
Pn patient need appointment in week off 09/30-10/05 but there is no opening available,please contact patient.

## 2024-09-25 ENCOUNTER — TELEPHONE (OUTPATIENT)
Dept: OBGYN CLINIC | Facility: CLINIC | Age: 29
End: 2024-09-25

## 2024-09-25 DIAGNOSIS — J02.9 SORE THROAT: Primary | ICD-10-CM

## 2024-09-26 ENCOUNTER — TELEPHONE (OUTPATIENT)
Dept: OBGYN CLINIC | Facility: CLINIC | Age: 29
End: 2024-09-26

## 2024-09-26 NOTE — TELEPHONE ENCOUNTER
Pt is GDM -- no logs received at all for our review. She was told at visit to email us the log. Please call her if we did not receive yet

## 2024-09-26 NOTE — TELEPHONE ENCOUNTER
32w2d  Mamta calling to report sore throat and congestion.   Reviewed OTC recommendations, sent via [facility].  Advised to complete covid test. CS # sent via [facility].   Patient verbalized understanding.     Await covid test.

## 2024-09-26 NOTE — PROGRESS NOTES
Seeing Dr. Green for anemia. No sugar log since diagnosis of GDM -- has been sending to Dr. Garcia. Informed pt she must send us the log for us to manage tightly.  Wishes for TDAP. RSV reviewed.    Please call your insurance about RSV vaccine coverage called [employer]. You are eligible to receive if you are 32 weeks 0 days until 36 weeks 6 days during Sept 1st-Jan 31st. A fee waiver for $683 will be required to be signed even if your insurance states it will cover. Newborns may also receive the vaccine (Beyfortus) after delivery however pricing is estimated to be higher ($1200).

## 2024-09-26 NOTE — TELEPHONE ENCOUNTER
Mamta notified to send log. She has only been keeping paper log since 9/23, so she is planning to send log tomorrow.   Await log.

## 2024-09-28 ENCOUNTER — PATIENT MESSAGE (OUTPATIENT)
Dept: OBGYN CLINIC | Facility: CLINIC | Age: 29
End: 2024-09-28

## 2024-09-30 NOTE — TELEPHONE ENCOUNTER
From: Mamta Naqvi  To: Alisa James  Sent: 9/28/2024 2:30 PM CDT  Subject: Sugar levels    Hello please find my sugar readings.    Thank you

## 2024-10-02 ENCOUNTER — TELEPHONE (OUTPATIENT)
Dept: HEMATOLOGY/ONCOLOGY | Facility: HOSPITAL | Age: 29
End: 2024-10-02

## 2024-10-02 NOTE — TELEPHONE ENCOUNTER
Called and spoke with Mamta. Reminded her there are repeat labs ordered by Dr. Green that he wants her to complete before her follow up appointment tomorrow, 10/03. Patient requested to reschedule her appointment to next week to coordinate with some other appointments since she lives far. Follow up appointment rescheduled for Wednesday, 10/9 at 1:00 pm. Reminded her to complete the labs before her appointment and told her she does not have to fast for the labs. She stated understanding and thanked me for the call.

## 2024-10-03 ENCOUNTER — APPOINTMENT (OUTPATIENT)
Dept: HEMATOLOGY/ONCOLOGY | Facility: HOSPITAL | Age: 29
End: 2024-10-03
Attending: NURSE PRACTITIONER
Payer: COMMERCIAL

## 2024-10-08 ENCOUNTER — TELEPHONE (OUTPATIENT)
Dept: OBGYN CLINIC | Facility: CLINIC | Age: 29
End: 2024-10-08

## 2024-10-09 ENCOUNTER — OFFICE VISIT (OUTPATIENT)
Dept: HEMATOLOGY/ONCOLOGY | Facility: HOSPITAL | Age: 29
End: 2024-10-09
Attending: INTERNAL MEDICINE
Payer: COMMERCIAL

## 2024-10-09 ENCOUNTER — ROUTINE PRENATAL (OUTPATIENT)
Dept: OBGYN CLINIC | Facility: CLINIC | Age: 29
End: 2024-10-09

## 2024-10-09 VITALS
RESPIRATION RATE: 16 BRPM | BODY MASS INDEX: 31.92 KG/M2 | SYSTOLIC BLOOD PRESSURE: 118 MMHG | OXYGEN SATURATION: 99 % | DIASTOLIC BLOOD PRESSURE: 64 MMHG | WEIGHT: 187 LBS | HEIGHT: 64 IN | HEART RATE: 112 BPM | TEMPERATURE: 99 F

## 2024-10-09 VITALS
BODY MASS INDEX: 32 KG/M2 | SYSTOLIC BLOOD PRESSURE: 112 MMHG | WEIGHT: 187.63 LBS | DIASTOLIC BLOOD PRESSURE: 77 MMHG | HEART RATE: 114 BPM

## 2024-10-09 DIAGNOSIS — D50.9 MICROCYTIC ANEMIA: ICD-10-CM

## 2024-10-09 DIAGNOSIS — D56.3 BETA THALASSEMIA TRAIT: Primary | ICD-10-CM

## 2024-10-09 DIAGNOSIS — O99.013 ANEMIA DURING PREGNANCY IN THIRD TRIMESTER (HCC): ICD-10-CM

## 2024-10-09 DIAGNOSIS — Z34.92 SECOND TRIMESTER PREGNANCY (HCC): ICD-10-CM

## 2024-10-09 DIAGNOSIS — D50.8 OTHER IRON DEFICIENCY ANEMIA: ICD-10-CM

## 2024-10-09 DIAGNOSIS — Z34.83 ENCOUNTER FOR SUPERVISION OF OTHER NORMAL PREGNANCY IN THIRD TRIMESTER (HCC): Primary | ICD-10-CM

## 2024-10-09 DIAGNOSIS — E53.8 VITAMIN B12 DEFICIENCY: ICD-10-CM

## 2024-10-09 LAB
APPEARANCE: CLEAR
BASOPHILS # BLD AUTO: 0.03 X10(3) UL (ref 0–0.2)
BASOPHILS NFR BLD AUTO: 0.4 %
BILIRUBIN: NEGATIVE
DEPRECATED HBV CORE AB SER IA-ACNC: 29 NG/ML
DEPRECATED RDW RBC AUTO: 40.7 FL (ref 35.1–46.3)
EOSINOPHIL # BLD AUTO: 0.03 X10(3) UL (ref 0–0.7)
EOSINOPHIL NFR BLD AUTO: 0.4 %
ERYTHROCYTE [DISTWIDTH] IN BLOOD BY AUTOMATED COUNT: 18.1 % (ref 11–15)
GLUCOSE (URINE DIPSTICK): NEGATIVE MG/DL
HCT VFR BLD AUTO: 31.3 %
HGB BLD-MCNC: 10.4 G/DL
IMM GRANULOCYTES # BLD AUTO: 0.13 X10(3) UL (ref 0–1)
IMM GRANULOCYTES NFR BLD: 1.6 %
IRON SATN MFR SERPL: 27 %
IRON SERPL-MCNC: 143 UG/DL
KETONES (URINE DIPSTICK): NEGATIVE MG/DL
LYMPHOCYTES # BLD AUTO: 1.57 X10(3) UL (ref 1–4)
LYMPHOCYTES NFR BLD AUTO: 18.9 %
MCH RBC QN AUTO: 22.7 PG (ref 26–34)
MCHC RBC AUTO-ENTMCNC: 33.2 G/DL (ref 31–37)
MCV RBC AUTO: 68.2 FL
MONOCYTES # BLD AUTO: 0.42 X10(3) UL (ref 0.1–1)
MONOCYTES NFR BLD AUTO: 5.1 %
MULTISTIX LOT#: ABNORMAL NUMERIC
NEUTROPHILS # BLD AUTO: 6.13 X10 (3) UL (ref 1.5–7.7)
NEUTROPHILS # BLD AUTO: 6.13 X10(3) UL (ref 1.5–7.7)
NEUTROPHILS NFR BLD AUTO: 73.6 %
NITRITE, URINE: NEGATIVE
OCCULT BLOOD: NEGATIVE
PH, URINE: 6.5 (ref 4.5–8)
PLATELET # BLD AUTO: 301 10(3)UL (ref 150–450)
PROTEIN (URINE DIPSTICK): NEGATIVE MG/DL
RBC # BLD AUTO: 4.59 X10(6)UL
SPECIFIC GRAVITY: 1.01 (ref 1–1.03)
TIBC SERPL-MCNC: 538 UG/DL (ref 250–425)
TRANSFERRIN SERPL-MCNC: 361 MG/DL (ref 250–380)
URINE-COLOR: YELLOW
UROBILINOGEN,SEMI-QN: 0.2 MG/DL (ref 0–1.9)
VIT B12 SERPL-MCNC: 207 PG/ML (ref 211–911)
WBC # BLD AUTO: 8.3 X10(3) UL (ref 4–11)

## 2024-10-09 PROCEDURE — 90656 IIV3 VACC NO PRSV 0.5 ML IM: CPT | Performed by: OBSTETRICS & GYNECOLOGY

## 2024-10-09 PROCEDURE — 99214 OFFICE O/P EST MOD 30 MIN: CPT | Performed by: NURSE PRACTITIONER

## 2024-10-09 PROCEDURE — 81002 URINALYSIS NONAUTO W/O SCOPE: CPT | Performed by: OBSTETRICS & GYNECOLOGY

## 2024-10-09 PROCEDURE — 90471 IMMUNIZATION ADMIN: CPT | Performed by: OBSTETRICS & GYNECOLOGY

## 2024-10-09 PROCEDURE — 90678 RSV VACC PREF BIVALENT IM: CPT | Performed by: OBSTETRICS & GYNECOLOGY

## 2024-10-09 PROCEDURE — 90472 IMMUNIZATION ADMIN EACH ADD: CPT | Performed by: OBSTETRICS & GYNECOLOGY

## 2024-10-09 NOTE — PROGRESS NOTES
Hematology Progress Note    Patient Name: Mamta Naqvi   YOB: 1995   Medical Record Number: [id number]   CSN: [id number]   Consulting Physician: Mesfin Green MD  Referring Physician(s): Cecilia Ruiz MD  Date of Visit: 10/9/24      Chief Complaint:  Elevated hgb F, beta-thal trait, 3rd trimester pregnancy, B12 deficiency     History of Present Illness:     Mamta Naqvi is a 29 year old female that was seen today in the Hematology suite for evaluation of the above condition. Patient has no PMH. She is  at 23 weeks and 2 days gestation at the time of consultation.  Patient is being evaluated for elevated hemoglobin F levels, beta thalassemia trait she is currently in second trimester pregnancy.  Patient reports pregnancy has been going well.  She denies bleeding or bruising complications during pregnancy.  No reports of gestational diabetes and she reports she has been gaining weight appropriately for pregnancy and no signs of HTN.  Patient has no systemic signs of illness or other concerns on ROS.  Patient underwent hemoglobin after pheresis in light of macrocytic anemia 2024.  Lab results show hemoglobin F at 8.6% with decreased value of hemoglobin A and an increased value of hemoglobin A2; findings consistent with beta thalassemia trait.     Interval History:    Mamta is now at 34 wks and 1 days of pregnancy. She denies any new complications with pregnancy w/ no reports of blood loss during pregnancy. Pt has no new concerns on ROS.    Feeling well. Some fatigue in evening after working.Pt states she has not been taking supplements. She took one bottle of B12 and did not refill. Never did start oral iron and currently out of Prenatal vitamins. Encouraged pt to take her supplements.     Past Medical History:  Past Medical History:    History of varicella       Past Surgical History:  Past Surgical History:   Procedure Laterality Date    Other surgical history      D&C x 2 per pt in  2023; no bleeding complications       Family Medical History:  Family History   Problem Relation Age of Onset    Diabetes Mother         Type 2    Bleeding Disorders Neg     DVT/VTE Neg     Cancer Neg        Social History:  Social History     Socioeconomic History    Marital status:      Spouse name: Not on file    Number of children: Not on file    Years of education: Not on file    Highest education level: Not on file   Occupational History    Not on file   Tobacco Use    Smoking status: Never    Smokeless tobacco: Never   Vaping Use    Vaping status: Never Used   Substance and Sexual Activity    Alcohol use: Never    Drug use: Never    Sexual activity: Not on file   Other Topics Concern    Not on file   Social History Narrative    Mamta is . She has no children. Patient works as a . She lives with her  in [place], [place].     Social Drivers of Health     Financial Resource Strain: Not on file   Food Insecurity: Not on file   Transportation Needs: Not on file   Stress: Not on file   Housing Stability: Not on file       Allergies:   No Known Allergies    Current Medications:  No outpatient medications have been marked as taking for the 10/9/24 encounter (Office Visit) with Payton Stiles APRN.       Review of Systems:    Constitutional: Negative for anorexia, chills, fatigue, fevers, night sweats and weight loss.  Eyes: Negative for visual disturbance, irritation and redness.  Respiratory: Negative for cough, hemoptysis, chest pain, or dyspnea on exertion.  Gastrointestinal: Negative for dysphagia, odynophagia, reflux symptoms, nausea, vomiting, change in bowel habits, diarrhea, constipation and abdominal pain.  Integument/breast: Negative for rash, skin lesions, and pruritus.  Hematologic/lymphatic: Negative for easy bruising, bleeding, and lymphadenopathy.  Musculoskeletal: Negative for myalgias, arthralgias, muscle weakness.  Neurological: Negative for headaches, dizziness,  speech problems, gait problems and weakness.    A comprehensive 14 point review of systems was completed.  Pertinent positives and negatives noted in the the HPI.     Vital Signs:  /64 (BP Location: Right arm, Patient Position: Sitting, Cuff Size: adult)   Pulse 112   Temp 98.5 °F (36.9 °C) (Oral)   Resp 16   Ht 1.626 m (5' 4\")   Wt 84.8 kg (187 lb)   LMP 02/13/2024 (Exact Date)   SpO2 99%   BMI 32.10 kg/m²     Physical Examination:    General: Patient is alert and oriented x 3, not in acute distress.  Psych: Friendly, cooperative with appropriate questions and responses  HEENT: EOMs intact. Oropharynx is clear.   Neck:  No palpable lymphadenopathy. Neck is supple.  Lymphatics: There is no palpable lymphadenopathy throughout in the cervical, supraclavicular, axillary, or inguinal regions.  Chest: Clear to auscultation. No wheezes or rales.  Heart: Regular rate and rhythm. S1S2 normal.  Abdomen: Gravidas abdomen appropriate for gestational age  Extremities: No edema or calf tenderness.  Neurological: Grossly intact.      Labs:    Lab Results   Component Value Date/Time    WBC 9.0 08/21/2024 08:47 AM    RBC 4.18 08/21/2024 08:47 AM    HGB 9.4 (L) 08/21/2024 08:47 AM    HCT 29.4 (L) 08/21/2024 08:47 AM    MCV 70.3 (L) 08/21/2024 08:47 AM    MCH 22.5 (L) 08/21/2024 08:47 AM    MCHC 32.0 08/21/2024 08:47 AM    RDW 19.2 (H) 08/21/2024 08:47 AM    NEPRELIM 6.77 07/25/2024 01:22 PM    .0 08/21/2024 08:47 AM       Lab Results   Component Value Date/Time    GLU 85 07/25/2024 01:22 PM    BUN 9 07/25/2024 01:22 PM    CREATSERUM 0.50 (L) 07/25/2024 01:22 PM    GFRNAA 121 05/12/2022 11:49 AM    CA 8.9 07/25/2024 01:22 PM    ALB 4.2 07/25/2024 01:22 PM     07/25/2024 01:22 PM    K 3.6 07/25/2024 01:22 PM     07/25/2024 01:22 PM    CO2 22.0 07/25/2024 01:22 PM    ALKPHO 115 (H) 07/25/2024 01:22 PM    AST 20 07/25/2024 01:22 PM    ALT 12 07/25/2024 01:22 PM         Impression:      ICD-10-CM    1.  Beta thalassemia trait  D56.3       2. Microcytic anemia  D50.9 CBC W/DIFF [E]     FERRITIN [E]     VITAMIN B12 [E]      3. Other iron deficiency anemia  D50.8 CBC W/DIFF [E]     FERRITIN [E]     IRON AND TIBC [E]     IRON AND TIBC [E]      4. Second trimester pregnancy ([facility])  Z34.92 CBC W/DIFF [E]     FERRITIN [E]     VITAMIN B12 [E]      5. Vitamin B12 deficiency  E53.8 VITAMIN B12 [E]      6. Anemia during pregnancy in third trimester ([facility])  O99.013           29 year old W with no PMH presents for an evaluation of beta thalassemia trait with resulting elevated hemoglobin F value    Plan:    1.) Elevated hgb F    --her hgb F level at 5.6% is consistent with what we typically see for beta thalassemia trait patients as they can typically values between 1 to 10%; this pt has beta thal, so this is an expected finding  --no evidence of her having aplastic anemia as her CBC+diff  only shows microcytic anemia  --this may also be a finding in pregnant patients as there is contamination with fetal-maternal bleeding; pt denies any bleeding  --not clinically concerning; f/u as planned    2.) Beta thalassemia trait    --This is a benign hereditary carrier condition which typically does not cause any hemolysis  --this will cause microcytosis which can be confusing for iron deficiency anemia    3.) Microcytic Anemia in 2nd trimester pregnancy    -- Patient's current labs show iron deficiency anemia; starting oral iron supplement daily. She is not interested in IV iron    --B12 levels are low so continue her on B12 replacement therapy and patient will follow-up in 6 wks  --no other types of anemia noted; no signs of hemolysis present    Patient to take oral iron , B 12 supplements and prenatal vitamins.   Labs drawn today at visit    Follow up post partum with labs           MDM: Moderate Risk    Payton SALAZAR   [facility]  [address]  [address]

## 2024-10-09 NOTE — PROGRESS NOTES
Pt 34w 1d here of routine PN visit requesting flu and RSV vaccine. Pt verified, risk and benefits discussed and all questions answered to pts satisfaction. Written consent obtained for Flu and financial waiver for RSV. 5 rights of medication administration performed. Flu given IM in the left deltoid and RSV given IM in the right deltoid. Pt tolerated injections well. Pt given VIS sheets and discharged via ambulatory with belongings.

## 2024-10-14 ENCOUNTER — PATIENT MESSAGE (OUTPATIENT)
Dept: ENDOCRINOLOGY CLINIC | Facility: CLINIC | Age: 29
End: 2024-10-14

## 2024-10-14 ENCOUNTER — PATIENT MESSAGE (OUTPATIENT)
Dept: OBGYN CLINIC | Facility: CLINIC | Age: 29
End: 2024-10-14

## 2024-10-14 DIAGNOSIS — O24.419 GESTATIONAL DIABETES MELLITUS (GDM) IN SECOND TRIMESTER, GESTATIONAL DIABETES METHOD OF CONTROL UNSPECIFIED (HCC): ICD-10-CM

## 2024-10-14 NOTE — TELEPHONE ENCOUNTER
Patient is requesting a refill to be sent to [facility].  She uses Contour next glucometer and will need test strips.  Patient states she is out of strips

## 2024-10-15 RX ORDER — INSULIN ASPART 100 [IU]/ML
INJECTION, SOLUTION INTRAVENOUS; SUBCUTANEOUS
Qty: 10 ML | Refills: 0 | Status: SHIPPED | OUTPATIENT
Start: 2024-10-15

## 2024-10-15 NOTE — TELEPHONE ENCOUNTER
Per Dr. Howard, patient to start 0+2+2+0NPH. Insulin sent to patient's preferred pharmacy and patient informed.

## 2024-10-16 RX ORDER — LANCETS
EACH MISCELLANEOUS
Qty: 400 EACH | Refills: 0 | Status: SHIPPED | OUTPATIENT
Start: 2024-10-16

## 2024-10-16 RX ORDER — BLOOD-GLUCOSE METER
1 EACH MISCELLANEOUS ONCE
Qty: 1 KIT | Refills: 0 | Status: SHIPPED | OUTPATIENT
Start: 2024-10-16 | End: 2024-10-16

## 2024-10-16 NOTE — TELEPHONE ENCOUNTER
Rx sent for glucometer and lancets.     Endocrine Refill protocol for Glucose testing supplies     Protocol Criteria: PASSED     If below requirement is met, send a 90-day supply with 1 refill per provider protocol.    Verify appointment with Endocrinology completed in the last 6 months or scheduled in the next 3 months.    Last completed office visit: 9/13/2024 Zayda Garcia MD   Next scheduled Follow up:   Future Appointments   Date Time Provider Department Center   10/22/2024  3:50 PM [facility]   10/29/2024  2:40 PM [facility]   11/9/2024  9:00 AM [facility]ilia Ruiz [facility]   12/5/2024  3:00 PM Mesfin Green MD [facility]

## 2024-10-20 ENCOUNTER — HOSPITAL ENCOUNTER (OUTPATIENT)
Facility: HOSPITAL | Age: 29
Discharge: HOME | End: 2024-10-20
Attending: OBSTETRICS & GYNECOLOGY | Admitting: OBSTETRICS & GYNECOLOGY
Payer: COMMERCIAL

## 2024-10-20 VITALS
DIASTOLIC BLOOD PRESSURE: 77 MMHG | BODY MASS INDEX: 32 KG/M2 | HEART RATE: 112 BPM | SYSTOLIC BLOOD PRESSURE: 117 MMHG | WEIGHT: 187 LBS

## 2024-10-20 DIAGNOSIS — O24.419 GESTATIONAL DIABETES MELLITUS (GDM) IN SECOND TRIMESTER, GESTATIONAL DIABETES METHOD OF CONTROL UNSPECIFIED (HCC): ICD-10-CM

## 2024-10-20 PROCEDURE — 59025 FETAL NON-STRESS TEST: CPT | Performed by: OBSTETRICS & GYNECOLOGY

## 2024-10-21 NOTE — PROGRESS NOTES
Pt is a 29 year old female admitted to TR4/TR4-A.     Chief Complaint   Patient presents with    R/o Rom     Patient states she felt a gush of fluid. Denies vaginal bleeding. +FM      Pt is  35w5d intra-uterine pregnancy.  History obtained, consents signed. Oriented to room, staff, and plan of care.

## 2024-10-21 NOTE — TRIAGE
[facility]  part of [facility]      Triage Note    Mamta Naqvi Patient Status:  Outpatient in a Bed    1995 MRN [id number]   Location [facility] Attending Cecilia Ruiz MD   Hosp Day # 0 PCP No primary care provider on file.      Para:   Estimated Date of Delivery: 24  Gestation: 35w5d    Chief Complaint    R/o Rom; R/o Labor         Allergies:  Allergies[1]    Orders Placed This Encounter   Procedures    POCT Ferning       Lab Results   Component Value Date    WBC 8.3 10/09/2024    HGB 10.4 (L) 10/09/2024    HCT 31.3 (L) 10/09/2024    .0 10/09/2024    CREATSERUM 0.50 (L) 2024    BUN 9 2024     2024    K 3.6 2024     2024    CO2 22.0 2024    GLU 85 2024    CA 8.9 2024    ALB 4.2 2024    ALKPHO 115 (H) 2024    BILT 0.5 2024    TP 6.9 2024    AST 20 2024    ALT 12 2024    B12 207 (L) 10/09/2024       Clinitek UA  Lab Results   Component Value Date    SPECGRAVITY 1.015 10/09/2024    URINECUL No Growth at 18-24 hrs. 2024       UA  Lab Results   Component Value Date    SPECGRAVITY 1.015 10/09/2024    NITRITE Negative 10/09/2024       Vitals:    10/20/24 2100 10/20/24 2109   BP: 117/77    Pulse: 112    Weight:  84.8 kg (187 lb)       NST  Variability: Moderate           Accelerations: Yes           Decelerations: None            Baseline: 140 BPM           Uterine Irritability: No           Contractions: Irregular                                        Acoustic Stimulator: No           Nonstress Test Interpretation: Reactive                      FHR Category: Category I             Additional Comments       Chief Complaint   Patient presents with    R/o Rom     Patient states she felt a gush of fluid. Denies vaginal bleeding. +FM    R/o Labor     Irregular ctx     Patient came into triage stating that she felt a gush of fluid. Also  states she has occasional back pain. Speculum exam performed at bedside. No pooling detected. Amnio swab was negative. Cervix was checked and is closed. MD notified about results and strip. Discharge order given. Patient received written and verbal instructions and verbalized understanding. Discharged home in stable condition with spouse.     Phillip UGALDE RN  10/20/2024 9:57 PM  I have reviewed the NST and agree with the above findings and recommendations.        [1] No Known Allergies

## 2024-10-22 ENCOUNTER — ROUTINE PRENATAL (OUTPATIENT)
Dept: OBGYN CLINIC | Facility: CLINIC | Age: 29
End: 2024-10-22

## 2024-10-22 VITALS
HEART RATE: 108 BPM | WEIGHT: 189.38 LBS | DIASTOLIC BLOOD PRESSURE: 78 MMHG | BODY MASS INDEX: 33 KG/M2 | SYSTOLIC BLOOD PRESSURE: 113 MMHG

## 2024-10-22 DIAGNOSIS — O24.410 DIET CONTROLLED GESTATIONAL DIABETES MELLITUS (GDM) IN THIRD TRIMESTER (HCC): ICD-10-CM

## 2024-10-22 DIAGNOSIS — Z34.93 ENCOUNTER FOR SUPERVISION OF NORMAL PREGNANCY IN THIRD TRIMESTER, UNSPECIFIED GRAVIDITY (HCC): Primary | ICD-10-CM

## 2024-10-22 LAB
APPEARANCE: CLEAR
BILIRUBIN: NEGATIVE
GLUCOSE (URINE DIPSTICK): NEGATIVE MG/DL
KETONES (URINE DIPSTICK): NEGATIVE MG/DL
MULTISTIX LOT#: ABNORMAL NUMERIC
NITRITE, URINE: NEGATIVE
OCCULT BLOOD: NEGATIVE
PH, URINE: 6.5 (ref 4.5–8)
PROTEIN (URINE DIPSTICK): NEGATIVE MG/DL
SPECIFIC GRAVITY: 1 (ref 1–1.03)
URINE-COLOR: YELLOW
UROBILINOGEN,SEMI-QN: 0.2 MG/DL (ref 0–1.9)

## 2024-10-22 PROCEDURE — 81002 URINALYSIS NONAUTO W/O SCOPE: CPT | Performed by: OBSTETRICS & GYNECOLOGY

## 2024-10-22 NOTE — PROGRESS NOTES
GBS.  Order for NST.  States that she is not taking insulin.  Her elevated BS were when she was using someone else's meter.  Her BS have been normal on diet with her monitor.  Will send BS log today.  RTC 1 wk

## 2024-10-23 ENCOUNTER — PATIENT MESSAGE (OUTPATIENT)
Dept: OBGYN CLINIC | Facility: CLINIC | Age: 29
End: 2024-10-23

## 2024-10-23 DIAGNOSIS — O24.410 DIET CONTROLLED GESTATIONAL DIABETES MELLITUS (GDM) IN THIRD TRIMESTER (HCC): ICD-10-CM

## 2024-10-23 LAB — GROUP B STREP BY PCR FOR PCR OVT: NEGATIVE

## 2024-10-24 ENCOUNTER — HOSPITAL ENCOUNTER (OUTPATIENT)
Facility: HOSPITAL | Age: 29
Discharge: HOME | End: 2024-10-24
Attending: OBSTETRICS & GYNECOLOGY | Admitting: OBSTETRICS & GYNECOLOGY
Payer: COMMERCIAL

## 2024-10-24 VITALS
RESPIRATION RATE: 18 BRPM | WEIGHT: 189 LBS | BODY MASS INDEX: 32 KG/M2 | HEART RATE: 117 BPM | SYSTOLIC BLOOD PRESSURE: 117 MMHG | DIASTOLIC BLOOD PRESSURE: 89 MMHG

## 2024-10-24 DIAGNOSIS — Z34.90 PREGNANCY (HCC): ICD-10-CM

## 2024-10-24 DIAGNOSIS — O24.419 GESTATIONAL DIABETES MELLITUS (GDM) IN SECOND TRIMESTER, GESTATIONAL DIABETES METHOD OF CONTROL UNSPECIFIED (HCC): ICD-10-CM

## 2024-10-24 PROCEDURE — 59025 FETAL NON-STRESS TEST: CPT | Performed by: OBSTETRICS & GYNECOLOGY

## 2024-10-24 PROCEDURE — 59025 FETAL NON-STRESS TEST: CPT

## 2024-10-25 ENCOUNTER — TELEPHONE (OUTPATIENT)
Dept: OBGYN CLINIC | Facility: CLINIC | Age: 29
End: 2024-10-25

## 2024-10-25 DIAGNOSIS — R09.89 RUNNY NOSE: ICD-10-CM

## 2024-10-25 DIAGNOSIS — E53.8 VITAMIN B12 DEFICIENCY: ICD-10-CM

## 2024-10-25 DIAGNOSIS — R09.81 NASAL CONGESTION: ICD-10-CM

## 2024-10-25 DIAGNOSIS — R05.1 ACUTE COUGH: Primary | ICD-10-CM

## 2024-10-25 DIAGNOSIS — O24.410 DIET CONTROLLED GESTATIONAL DIABETES MELLITUS (GDM) IN THIRD TRIMESTER (HCC): ICD-10-CM

## 2024-10-25 DIAGNOSIS — D50.9 MICROCYTIC ANEMIA: ICD-10-CM

## 2024-10-25 DIAGNOSIS — D50.8 OTHER IRON DEFICIENCY ANEMIA: ICD-10-CM

## 2024-10-25 DIAGNOSIS — R52 BODY ACHES: ICD-10-CM

## 2024-10-25 DIAGNOSIS — Z34.92 SECOND TRIMESTER PREGNANCY (HCC): ICD-10-CM

## 2024-10-25 RX ORDER — FERROUS SULFATE 325(65) MG
325 TABLET ORAL
Qty: 90 TABLET | Refills: 0 | Status: SHIPPED | OUTPATIENT
Start: 2024-10-25

## 2024-10-25 RX ORDER — MELATONIN
1000 DAILY
Qty: 90 TABLET | Refills: 2 | Status: SHIPPED | OUTPATIENT
Start: 2024-10-25

## 2024-10-25 NOTE — TELEPHONE ENCOUNTER
36w3d. Mamta calling with symptoms that started this morning this morning. Body aches,  nasal congestion, with runny nose, 99.7 temp and cough. She states +fetal movement, denies leakage of fluid, vaginal bleeding or ctxs. She is asking for OTC medications safe in pregnancy.     We discussed OTC medications that are safe-and list sent via .   At home comfort care instructions given-Encouraged hydration  Patient states at home Covid this morning was negative, informed will need to complete PCR prior to appointment on the 29th. Patient states understanding  Instructed to call office if fever 100.4 or greater, decrease fetal movement, leakage of fluid, vaginal bleeding or ctxs.     Held in inbox to follow up on covid results.     To Dr. James on-call for sign-off. Thank you.

## 2024-10-25 NOTE — PROGRESS NOTES
Pt is a 29 year old female admitted to TR1/TR1-A.     Chief Complaint   Patient presents with    Non-stress Test     Patient here for scheduled NST due to gestational diabetes      Pt is  36w2d intra-uterine pregnancy.  History obtained, consents signed. Oriented to room, staff, and plan of care.

## 2024-10-28 ENCOUNTER — NST DOCUMENTATION (OUTPATIENT)
Dept: OBGYN CLINIC | Facility: CLINIC | Age: 29
End: 2024-10-28

## 2024-10-28 DIAGNOSIS — O24.410 DIET CONTROLLED GESTATIONAL DIABETES MELLITUS (GDM) IN THIRD TRIMESTER (HCC): ICD-10-CM

## 2024-10-28 DIAGNOSIS — O99.013 ANEMIA DURING PREGNANCY IN THIRD TRIMESTER (HCC): Primary | ICD-10-CM

## 2024-10-28 DIAGNOSIS — D50.8 OTHER IRON DEFICIENCY ANEMIA: ICD-10-CM

## 2024-10-28 DIAGNOSIS — E53.8 VITAMIN B12 DEFICIENCY: ICD-10-CM

## 2024-10-28 NOTE — NST
Nonstress Test   Patient: Mamta Naqvi    Gestation: 36w6d    Diagnosis from order: Diet controlled gestational diabetes mellitus (GDM) in third trimester ([facility])    Problem List:   Patient Active Problem List   Diagnosis    RBC microcytosis    Beta thalassemia trait    GDM (gestational diabetes mellitus) ([facility])    Anemia during pregnancy in third trimester ([facility])       NST:        10/24/2024   NST DOCUMENTATION   Variability 6-25 BPM   Accelerations Yes   Decelerations None   Baseline 145 BPM   Uterine Irritability No   Contractions Not present   Acoustic Stimulator No   Nonstress Test Interpretation Reactive   Nonstress Test Second Interpretation Reactive   FHR Category Category I   Comments Patient is a  at 36.2 weeks here for a scheduled NST due to gestational diabetes. Patient endorses positive fetal movement and NST is reactive.   NST Completed by Ofelia Carvalho RN   Disposition Home    Testing Plan Weekly NST   Provider Notified Sara SHEPPARD            I agree with the above evaluation. NST completed.  Cecilia Ruiz MD  10/28/2024  9:48 AM

## 2024-10-29 ENCOUNTER — ROUTINE PRENATAL (OUTPATIENT)
Dept: OBGYN CLINIC | Facility: CLINIC | Age: 29
End: 2024-10-29

## 2024-10-29 VITALS
WEIGHT: 191 LBS | BODY MASS INDEX: 33 KG/M2 | SYSTOLIC BLOOD PRESSURE: 127 MMHG | DIASTOLIC BLOOD PRESSURE: 84 MMHG | HEART RATE: 109 BPM

## 2024-10-29 DIAGNOSIS — Z34.93 ENCOUNTER FOR SUPERVISION OF NORMAL PREGNANCY IN THIRD TRIMESTER, UNSPECIFIED GRAVIDITY (HCC): Primary | ICD-10-CM

## 2024-10-29 LAB
GLUCOSE (URINE DIPSTICK): NEGATIVE MG/DL
KETONES (URINE DIPSTICK): NEGATIVE MG/DL
MULTISTIX LOT#: ABNORMAL NUMERIC
NITRITE, URINE: NEGATIVE
OCCULT BLOOD: NEGATIVE
PH, URINE: 5 (ref 4.5–8)
PROTEIN (URINE DIPSTICK): NEGATIVE MG/DL
SPECIFIC GRAVITY: 1.01 (ref 1–1.03)
UROBILINOGEN,SEMI-QN: 0.2 MG/DL (ref 0–1.9)

## 2024-10-29 PROCEDURE — 81002 URINALYSIS NONAUTO W/O SCOPE: CPT | Performed by: OBSTETRICS & GYNECOLOGY

## 2024-10-31 ENCOUNTER — HOSPITAL ENCOUNTER (OUTPATIENT)
Facility: HOSPITAL | Age: 29
Discharge: HOME | End: 2024-10-31
Attending: OBSTETRICS & GYNECOLOGY | Admitting: OBSTETRICS & GYNECOLOGY
Payer: COMMERCIAL

## 2024-10-31 VITALS
HEART RATE: 98 BPM | TEMPERATURE: 98 F | RESPIRATION RATE: 17 BRPM | SYSTOLIC BLOOD PRESSURE: 119 MMHG | DIASTOLIC BLOOD PRESSURE: 78 MMHG

## 2024-10-31 DIAGNOSIS — O24.410 DIET CONTROLLED GESTATIONAL DIABETES MELLITUS (GDM) IN THIRD TRIMESTER (HCC): ICD-10-CM

## 2024-10-31 PROCEDURE — 59025 FETAL NON-STRESS TEST: CPT | Performed by: OBSTETRICS & GYNECOLOGY

## 2024-11-01 ENCOUNTER — NST DOCUMENTATION (OUTPATIENT)
Dept: OBGYN CLINIC | Facility: CLINIC | Age: 29
End: 2024-11-01

## 2024-11-01 NOTE — NST
Nonstress Test   Patient: Mamta Naqvi    Gestation: 37w3d    Diagnosis from order: Diet controlled gestational diabetes mellitus (GDM) in third trimester ([facility])    Problem List:   Patient Active Problem List   Diagnosis    RBC microcytosis    Beta thalassemia trait    GDM (gestational diabetes mellitus) ([facility])    Anemia during pregnancy in third trimester ([facility])       NST:        10/31/2024   NST DOCUMENTATION   Variability 6-25 BPM   Accelerations Yes   Decelerations None   Baseline 130 BPM   Uterine Irritability Yes   Contractions Irregular   Acoustic Stimulator No   Nonstress Test Interpretation Reactive   Nonstress Test Second Interpretation Reactive   FHR Category Category I   Comments Weekly NST for GDM reactive.   NST Completed by liliana fischer   Disposition home    Testing Plan Weekly NST   Provider Notified Dr Ng            I agree with the above evaluation. NST completed.  Yemi Ng DO  2024  4:31 AM

## 2024-11-01 NOTE — PROGRESS NOTES
Never did respiratory panel ordered despite fever 10/25. Diet only. Lasst log over one week ago. Seeing dr. Green. NST every thursday

## 2024-11-07 ENCOUNTER — HOSPITAL ENCOUNTER (OUTPATIENT)
Facility: HOSPITAL | Age: 29
Discharge: HOME | End: 2024-11-07
Attending: OBSTETRICS & GYNECOLOGY | Admitting: OBSTETRICS & GYNECOLOGY
Payer: COMMERCIAL

## 2024-11-07 VITALS — HEART RATE: 97 BPM | TEMPERATURE: 98 F | DIASTOLIC BLOOD PRESSURE: 61 MMHG | SYSTOLIC BLOOD PRESSURE: 106 MMHG

## 2024-11-07 PROCEDURE — 59025 FETAL NON-STRESS TEST: CPT

## 2024-11-07 PROCEDURE — 59025 FETAL NON-STRESS TEST: CPT | Performed by: OBSTETRICS & GYNECOLOGY

## 2024-11-08 NOTE — NST
Discharged to home per ambulatory in stable condition with written and verbal instructions. Patient verbalizes understanding of information given.   Nonstress Test   Patient: Mamta Naqvi    Gestation: 38w2d    NST:       Variability: Moderate           Accelerations: Yes           Decelerations: None            Baseline: 125 BPM           Uterine Irritability: Yes           Contractions: Irregular                    Contraction Frequency: Irregular with irritability                   Acoustic Stimulator: No           Nonstress Test Interpretation: Reactive           Nonstress Test Second Interpretation: Reactive                     Additional Comments Comments: Weekly NST for GDM. Reactive

## 2024-11-09 ENCOUNTER — ROUTINE PRENATAL (OUTPATIENT)
Dept: OBGYN CLINIC | Facility: CLINIC | Age: 29
End: 2024-11-09

## 2024-11-09 VITALS
WEIGHT: 193 LBS | BODY MASS INDEX: 33 KG/M2 | SYSTOLIC BLOOD PRESSURE: 117 MMHG | DIASTOLIC BLOOD PRESSURE: 79 MMHG | HEART RATE: 105 BPM

## 2024-11-09 DIAGNOSIS — Z34.91 ENCOUNTER FOR SUPERVISION OF NORMAL PREGNANCY IN FIRST TRIMESTER, UNSPECIFIED GRAVIDITY (HCC): Primary | ICD-10-CM

## 2024-11-09 LAB
APPEARANCE: CLEAR
BILIRUBIN: NEGATIVE
GLUCOSE (URINE DIPSTICK): NEGATIVE MG/DL
KETONES (URINE DIPSTICK): NEGATIVE MG/DL
LEUKOCYTES: NEGATIVE
MULTISTIX LOT#: NORMAL NUMERIC
NITRITE, URINE: NEGATIVE
OCCULT BLOOD: NEGATIVE
PH, URINE: 7 (ref 4.5–8)
PROTEIN (URINE DIPSTICK): NEGATIVE MG/DL
SPECIFIC GRAVITY: 1.01 (ref 1–1.03)
URINE-COLOR: YELLOW
UROBILINOGEN,SEMI-QN: 0.2 MG/DL (ref 0–1.9)

## 2024-11-12 ENCOUNTER — ANESTHESIA (OUTPATIENT)
Dept: OBGYN UNIT | Facility: HOSPITAL | Age: 29
End: 2024-11-12
Payer: COMMERCIAL

## 2024-11-12 ENCOUNTER — HOSPITAL ENCOUNTER (INPATIENT)
Facility: HOSPITAL | Age: 29
LOS: 3 days | Discharge: HOME | End: 2024-11-15
Attending: OBSTETRICS & GYNECOLOGY | Admitting: OBSTETRICS & GYNECOLOGY
Payer: COMMERCIAL

## 2024-11-12 ENCOUNTER — TELEPHONE (OUTPATIENT)
Dept: OBGYN CLINIC | Facility: CLINIC | Age: 29
End: 2024-11-12

## 2024-11-12 ENCOUNTER — HOSPITAL ENCOUNTER (OUTPATIENT)
Facility: HOSPITAL | Age: 29
Discharge: HOME | End: 2024-11-12
Attending: OBSTETRICS & GYNECOLOGY | Admitting: OBSTETRICS & GYNECOLOGY
Payer: COMMERCIAL

## 2024-11-12 ENCOUNTER — ANESTHESIA EVENT (OUTPATIENT)
Dept: OBGYN UNIT | Facility: HOSPITAL | Age: 29
End: 2024-11-12
Payer: COMMERCIAL

## 2024-11-12 VITALS
WEIGHT: 193 LBS | DIASTOLIC BLOOD PRESSURE: 70 MMHG | HEART RATE: 95 BPM | BODY MASS INDEX: 33 KG/M2 | SYSTOLIC BLOOD PRESSURE: 119 MMHG

## 2024-11-12 PROBLEM — Z34.90 PREGNANCY (HCC): Status: ACTIVE | Noted: 2024-11-12

## 2024-11-12 LAB
ANTIBODY SCREEN: NEGATIVE
BASOPHILS # BLD AUTO: 0.02 X10(3) UL (ref 0–0.2)
BASOPHILS NFR BLD AUTO: 0.2 %
DEPRECATED RDW RBC AUTO: 41.1 FL (ref 35.1–46.3)
EOSINOPHIL # BLD AUTO: 0.01 X10(3) UL (ref 0–0.7)
EOSINOPHIL NFR BLD AUTO: 0.1 %
ERYTHROCYTE [DISTWIDTH] IN BLOOD BY AUTOMATED COUNT: 18.9 % (ref 11–15)
GLUCOSE BLDC GLUCOMTR-MCNC: 81 MG/DL (ref 70–99)
GLUCOSE BLDC GLUCOMTR-MCNC: 84 MG/DL (ref 70–99)
HCT VFR BLD AUTO: 36.8 %
HGB BLD-MCNC: 11.7 G/DL
IMM GRANULOCYTES # BLD AUTO: 0.05 X10(3) UL (ref 0–1)
IMM GRANULOCYTES NFR BLD: 0.6 %
LYMPHOCYTES # BLD AUTO: 1.81 X10(3) UL (ref 1–4)
LYMPHOCYTES NFR BLD AUTO: 20 %
MCH RBC QN AUTO: 21.4 PG (ref 26–34)
MCHC RBC AUTO-ENTMCNC: 31.8 G/DL (ref 31–37)
MCV RBC AUTO: 67.3 FL
MONOCYTES # BLD AUTO: 0.45 X10(3) UL (ref 0.1–1)
MONOCYTES NFR BLD AUTO: 5 %
NEUTROPHILS # BLD AUTO: 6.71 X10 (3) UL (ref 1.5–7.7)
NEUTROPHILS # BLD AUTO: 6.71 X10(3) UL (ref 1.5–7.7)
NEUTROPHILS NFR BLD AUTO: 74.1 %
PLATELET # BLD AUTO: 306 10(3)UL (ref 150–450)
RBC # BLD AUTO: 5.47 X10(6)UL
RH BLOOD TYPE: POSITIVE
T PALLIDUM AB SER QL IA: NONREACTIVE
WBC # BLD AUTO: 9.1 X10(3) UL (ref 4–11)

## 2024-11-12 PROCEDURE — 59025 FETAL NON-STRESS TEST: CPT | Performed by: OBSTETRICS & GYNECOLOGY

## 2024-11-12 PROCEDURE — 10H07YZ INSERTION OF OTHER DEVICE INTO PRODUCTS OF CONCEPTION, VIA NATURAL OR ARTIFICIAL OPENING: ICD-10-PCS | Performed by: OBSTETRICS & GYNECOLOGY

## 2024-11-12 RX ORDER — BUPIVACAINE HCL/0.9 % NACL/PF 0.25 %
5 PLASTIC BAG, INJECTION (ML) EPIDURAL AS NEEDED
Status: DISCONTINUED | OUTPATIENT
Start: 2024-11-12 | End: 2024-11-12

## 2024-11-12 RX ORDER — IBUPROFEN 600 MG/1
600 TABLET, FILM COATED ORAL ONCE AS NEEDED
Status: DISCONTINUED | OUTPATIENT
Start: 2024-11-12 | End: 2024-11-13

## 2024-11-12 RX ORDER — ONDANSETRON 2 MG/ML
4 INJECTION INTRAMUSCULAR; INTRAVENOUS EVERY 6 HOURS PRN
Status: DISCONTINUED | OUTPATIENT
Start: 2024-11-12 | End: 2024-11-13

## 2024-11-12 RX ORDER — NALBUPHINE HYDROCHLORIDE 10 MG/ML
2.5 INJECTION INTRAMUSCULAR; INTRAVENOUS; SUBCUTANEOUS
Status: DISCONTINUED | OUTPATIENT
Start: 2024-11-12 | End: 2024-11-13

## 2024-11-12 RX ORDER — LIDOCAINE HYDROCHLORIDE AND EPINEPHRINE 15; 5 MG/ML; UG/ML
INJECTION, SOLUTION EPIDURAL AS NEEDED
Status: DISCONTINUED | OUTPATIENT
Start: 2024-11-12 | End: 2024-11-13 | Stop reason: SURG

## 2024-11-12 RX ORDER — NALBUPHINE HYDROCHLORIDE 10 MG/ML
2.5 INJECTION INTRAMUSCULAR; INTRAVENOUS; SUBCUTANEOUS
Status: DISCONTINUED | OUTPATIENT
Start: 2024-11-12 | End: 2024-11-12

## 2024-11-12 RX ORDER — ACETAMINOPHEN 500 MG
500 TABLET ORAL EVERY 6 HOURS PRN
Status: DISCONTINUED | OUTPATIENT
Start: 2024-11-12 | End: 2024-11-13

## 2024-11-12 RX ORDER — CITRIC ACID/SODIUM CITRATE 334-500MG
30 SOLUTION, ORAL ORAL AS NEEDED
Status: DISCONTINUED | OUTPATIENT
Start: 2024-11-12 | End: 2024-11-13

## 2024-11-12 RX ORDER — LIDOCAINE HYDROCHLORIDE 10 MG/ML
30 INJECTION, SOLUTION EPIDURAL; INFILTRATION; INTRACAUDAL; PERINEURAL ONCE
Status: COMPLETED | OUTPATIENT
Start: 2024-11-12 | End: 2024-11-13

## 2024-11-12 RX ORDER — TERBUTALINE SULFATE 1 MG/ML
0.25 INJECTION, SOLUTION SUBCUTANEOUS AS NEEDED
Status: DISCONTINUED | OUTPATIENT
Start: 2024-11-12 | End: 2024-11-13

## 2024-11-12 RX ORDER — BUPIVACAINE HYDROCHLORIDE 2.5 MG/ML
20 INJECTION, SOLUTION EPIDURAL; INFILTRATION; INTRACAUDAL ONCE
Status: DISCONTINUED | OUTPATIENT
Start: 2024-11-12 | End: 2024-11-12

## 2024-11-12 RX ORDER — DEXTROSE, SODIUM CHLORIDE, SODIUM LACTATE, POTASSIUM CHLORIDE, AND CALCIUM CHLORIDE 5; .6; .31; .03; .02 G/100ML; G/100ML; G/100ML; G/100ML; G/100ML
INJECTION, SOLUTION INTRAVENOUS CONTINUOUS
Status: DISCONTINUED | OUTPATIENT
Start: 2024-11-12 | End: 2024-11-13

## 2024-11-12 RX ORDER — BUPIVACAINE HYDROCHLORIDE 2.5 MG/ML
20 INJECTION, SOLUTION EPIDURAL; INFILTRATION; INTRACAUDAL ONCE
Status: DISCONTINUED | OUTPATIENT
Start: 2024-11-12 | End: 2024-11-13

## 2024-11-12 RX ORDER — ACETAMINOPHEN 500 MG
1000 TABLET ORAL EVERY 6 HOURS PRN
Status: DISCONTINUED | OUTPATIENT
Start: 2024-11-12 | End: 2024-11-13

## 2024-11-12 RX ORDER — BUPIVACAINE HCL/0.9 % NACL/PF 0.25 %
5 PLASTIC BAG, INJECTION (ML) EPIDURAL AS NEEDED
Status: DISCONTINUED | OUTPATIENT
Start: 2024-11-12 | End: 2024-11-13

## 2024-11-12 RX ORDER — SODIUM CHLORIDE, SODIUM LACTATE, POTASSIUM CHLORIDE, CALCIUM CHLORIDE 600; 310; 30; 20 MG/100ML; MG/100ML; MG/100ML; MG/100ML
INJECTION, SOLUTION INTRAVENOUS AS NEEDED
Status: DISCONTINUED | OUTPATIENT
Start: 2024-11-12 | End: 2024-11-13

## 2024-11-12 RX ORDER — LIDOCAINE HYDROCHLORIDE 10 MG/ML
INJECTION, SOLUTION EPIDURAL; INFILTRATION; INTRACAUDAL; PERINEURAL AS NEEDED
Status: DISCONTINUED | OUTPATIENT
Start: 2024-11-12 | End: 2024-11-13 | Stop reason: SURG

## 2024-11-12 RX ADMIN — LIDOCAINE HYDROCHLORIDE AND EPINEPHRINE 3 ML: 15; 5 INJECTION, SOLUTION EPIDURAL at 12:33:00

## 2024-11-12 RX ADMIN — LIDOCAINE HYDROCHLORIDE 5 ML: 10 INJECTION, SOLUTION EPIDURAL; INFILTRATION; INTRACAUDAL; PERINEURAL at 12:27:00

## 2024-11-12 NOTE — ANESTHESIA PROCEDURE NOTES
Labor Analgesia    Date/Time: 11/12/2024 12:25 PM    Performed by: Kristel Martinez MD  Authorized by: Kristel Martinez MD      General Information and Staff    Start Time:  11/12/2024 12:25 PM  End Time:  11/12/2024 12:38 PM  Anesthesiologist:  Kristel Martinez MD  Performed by:  Anesthesiologist  Patient Location:  OB  Site Identification: surface landmarks    Reason for Block: labor epidural    Preanesthetic Checklist: patient identified, IV checked, site marked, risks and benefits discussed, monitors and equipment checked, pre-op evaluation, timeout performed, anesthesia consent and sterile technique used      Procedure Details    Patient Position:  Sitting  Prep: ChloraPrep    Monitoring:  Heart rate  Approach:  Midline    Epidural Needle    Injection Technique:  LOBO saline  Needle Type:  Tuohy  Needle Gauge:  18 G  Needle Length:  3.5 in  Needle Insertion Depth:  6  Location:  L2-3    Spinal Needle      Catheter    Catheter Type:  Multi-orifice  Catheter Size:  20 G  Catheter at Skin Depth:  12  Test Dose:  Negative    Assessment    Sensory Level:  T10    Additional Comments

## 2024-11-12 NOTE — TELEPHONE ENCOUNTER
Patient is 39w0d, reports liquid running down leg at about 8:20 am today. States it was watery with sticky white discharge in it.   Also having painful contractions, has not timed contractions yet.   +fetal movement.   Directed patient to go to [facility] for eval. Advised patient to plan for admission for labor if water bag is leaking.   Dr. Mckinney on call and [facility] rn notified.

## 2024-11-12 NOTE — PROGRESS NOTES
Pt is a 29 year old female admitted to TR2/TR2-A.     Chief Complaint   Patient presents with    R/o Labor     Pt reports contractions every 5 minutes since 1900      Pt is  39w0d intra-uterine pregnancy.  History obtained, consents signed. Oriented to room, staff, and plan of care.

## 2024-11-12 NOTE — H&P
[facility]  part of [facility]    History & Physical    Mamta Naqvi Patient Status:  Inpatient    1995 MRN [id number]   Location [facility] Attending Barrett Mckinney, DO   Hosp Day # 0 PCP No primary care provider on file.     Date of Admission:  2024    SUBJECTIVE:    Mamta Naqvi is a 29 year old  at 39w0d with 2024, by Last Menstrual Period who presents c/o leakage of fluid since 830am.  Was seen in [facility] triage overnight to [facility] and sent home in latent labor.  Confirmed ROM with +pooling/ferning/nitrazine per triage RN.  Cervix at admission /-2.  Confirmed vertex by in house OB.      Lab Results   Component Value Date    GBS Negative 10/22/2024       Lab Results   Component Value Date    ABO BLOOD TYPE A 2024    RH BLOOD TYPE Positive 2024    HGB 11.7 (L) 2024    .0 2024    HCT 36.8 2024    Rubella IgG Positive 2024    Treponemal Antibodies Nonreactive 2024    HIV Antigen Antibody Combo Non-Reactive 2024    V. Zoster IgG Immunity 1,309.00 2024          Problem List:   Patient Active Problem List    Diagnosis    Pregnancy (HCC)    Anemia during pregnancy in third trimester (HCC)    GDM (gestational diabetes mellitus) (HCC)     2024: no sugar log for review -- pt to email to us for review asap    A1GDM: Weekly NST at 36 weeks. Weekly sugar diary. Delivery by 39 wk -  40 wk 6d  A2GDM: Fax sugars every 3-4 days. [  ]  NSTs after 32 wks & biweekly at 36 wks [  ] Delivery 38- 39wks [  ] growth u/s at 36 wks          Beta thalassemia trait     Seeing Dr. Green    Heterozygous  Recent Labs     24  1322 24  0847 10/09/24  1301   RBC 4.17 4.18 4.59   HGB 9.3* 9.4* 10.4*   HCT 28.7* 29.4* 31.3*   MCV 68.8* 70.3* 68.2*   MCH 22.3* 22.5* 22.7*   MCHC 32.4 32.0 33.2   RDW 20.1* 19.2* 18.1*   NEPRELIM 6.77  --  6.13   WBC 9.3 9.0 8.3   .0  291.0 301.0           Lab Results   Component Value Date    VICTOR HUGO 29 (L) 10/09/2024    VICTOR HUGO 24.1 (L) 2024    VICTOR HUGO 32.1 2024       If Hb less than 11, start iron & then recheck CBC/ferritin in one month    Decreased MCV, try iron x one month, repeat CBC.  If not improved significantly, then iron studies (ferritin, TIBC), Hb electorphoresis    Normal MCV: Hb electrophoresis    Increased MCV: check for vit B12, folate    If Hb<8, MFM & heme consults and consider transfusion           RBC microcytosis     -  Normal H/H with significant microcytosis. Dr. Mckinney had reviewed initial labs back in April and ordered follow up iron studies and electrophoresis. I did not see on lab section in episode. I called Mamta back and asked her to do his labs in next 1-2 weeks.        Obstetric History:   OB History    Para Term  AB Living   2       1     SAB IAB Ectopic Multiple Live Births   1              # Outcome Date GA Lbr Abel/2nd Weight Sex Type Anes PTL Lv   2 Current            1 SAB 2023 8w0d             Birth Comments: D&C x 2     Past Medical History:   Past Medical History:    Gestational diabetes (HCC)    History of varicella     Past Social History:   Past Surgical History:   Procedure Laterality Date    Other surgical history      D&C x 2 per pt in ; no bleeding complications     Family History:   Family History   Problem Relation Age of Onset    Diabetes Mother         Type 2    Bleeding Disorders Neg     DVT/VTE Neg     Cancer Neg      Social History:   Social History     Tobacco Use    Smoking status: Never    Smokeless tobacco: Never   Substance Use Topics    Alcohol use: Never       Home Meds: Prescriptions Prior to Admission[1]  Allergies: Allergies[2]    OBJECTIVE:    Temp:  [97.8 °F (36.6 °C)] 97.8 °F (36.6 °C)  Pulse:  [] 107  BP: (109-129)/(65-84) 120/84    General: Alert and Oriented  Abdomen: Soft, Gravid NT Uterus  Leopolds: 7#    Cervix:  3/90/-2  Presentation: vertex    FHT: cat 1  TOCOS: q5m s/p epidural      Lab Review:  Results for orders placed or performed during the hospital encounter of 24   CBC With Differential With Platelet    Collection Time: 24 11:23 AM   Result Value Ref Range    WBC 9.1 4.0 - 11.0 x10(3) uL    RBC 5.47 (H) 3.80 - 5.30 x10(6)uL    HGB 11.7 (L) 12.0 - 16.0 g/dL    HCT 36.8 35.0 - 48.0 %    MCV 67.3 (L) 80.0 - 100.0 fL    MCH 21.4 (L) 26.0 - 34.0 pg    MCHC 31.8 31.0 - 37.0 g/dL    RDW-SD 41.1 35.1 - 46.3 fL    RDW 18.9 (H) 11.0 - 15.0 %    .0 150.0 - 450.0 10(3)uL    Neutrophil Absolute Prelim 6.71 1.50 - 7.70 x10 (3) uL    Neutrophil Absolute 6.71 1.50 - 7.70 x10(3) uL    Lymphocyte Absolute 1.81 1.00 - 4.00 x10(3) uL    Monocyte Absolute 0.45 0.10 - 1.00 x10(3) uL    Eosinophil Absolute 0.01 0.00 - 0.70 x10(3) uL    Basophil Absolute 0.02 0.00 - 0.20 x10(3) uL    Immature Granulocyte Absolute 0.05 0.00 - 1.00 x10(3) uL    Neutrophil % 74.1 %    Lymphocyte % 20.0 %    Monocyte % 5.0 %    Eosinophil % 0.1 %    Basophil % 0.2 %    Immature Granulocyte % 0.6 %   T Pallidum Screening Cascade    Collection Time: 24 11:23 AM   Result Value Ref Range    Treponemal Antibodies Nonreactive Nonreactive    ABORH (Blood Type)    Collection Time: 24 11:23 AM   Result Value Ref Range    ABO BLOOD TYPE A     RH BLOOD TYPE Positive    Antibody Screen    Collection Time: 24 11:23 AM   Result Value Ref Range    Antibody Screen Negative    POCT Glucose    Collection Time: 24  1:09 PM   Result Value Ref Range    POC Glucose  84 70 - 99 mg/dL        ASSESSMENT:    Patient is a  at 39w0d presents SROM      PLAN:    Admit  Cefm/toco  FHTs reassuring   GBS neg  Labor: start pitocin augmentation given UCs spacing s/p epidural  H6XRH--WXn q6h  Analgesia: s/p epidural  All questions answered; pt voices understanding and agrees to proceed with POC      Barrett Mckinney DO  2024  1:31 PM          [1]   Medications Prior to Admission   Medication Sig Dispense Refill Last Dose/Taking    cyanocobalamin 1000 MCG Oral Tab Take 1 tablet (1,000 mcg total) by mouth daily. 90 tablet 2 2024    Ferrous Sulfate 325 (65 Fe) MG Oral Tab Take 1 tablet (325 mg total) by mouth daily with food. 90 tablet 0 2024    Microlet Lancets Does not apply Misc Check blood sugars 4 times per day 400 each 0 2024    Glucose Blood (CONTOUR NEXT TEST) In Vitro Strip Check sugars 4 times a day 400 each 0 2024    prenatal vitamin with DHA 27-0.8-228 MG Oral Cap Take 1 capsule by mouth daily.   2024    [] Blood Glucose Monitoring Suppl (CONTOUR NEXT MONITOR) w/Device Does not apply Kit 1 kit one time for 1 dose. 1 kit 0     insulin aspart 100 Units/mL Injection Solution Inject 2 Units into the skin daily before lunch AND 2 Units Before Dinner. (Patient not taking: Reported on 2024) 10 mL 0    [2] No Known Allergies

## 2024-11-12 NOTE — ANESTHESIA PREPROCEDURE EVALUATION
Anesthesia PreOp Note    HPI:     Mamta Naqvi is a 29 year old female who presents for preoperative consultation requested by: * No surgeons listed *    Date of Surgery: 11/12/2024    * No procedures listed *  Indication: * No pre-op diagnosis entered *    Relevant Problems   No relevant active problems       NPO:                         History Review:  Patient Active Problem List    Diagnosis Date Noted    Pregnancy ([facility]) 11/12/2024    Anemia during pregnancy in third trimester ([facility]) 10/09/2024    GDM (gestational diabetes mellitus) ([facility]) 09/06/2024    Beta thalassemia trait 07/19/2024    RBC microcytosis 06/12/2024       Past Medical History:    Gestational diabetes ([facility])    History of varicella       Past Surgical History:   Procedure Laterality Date    Other surgical history      D&C x 2 per pt in 2023; no bleeding complications       Prescriptions Prior to Admission[1]  Current Medications and Prescriptions Ordered in Epic[2]    Allergies[3]    Family History   Problem Relation Age of Onset    Diabetes Mother         Type 2    Bleeding Disorders Neg     DVT/VTE Neg     Cancer Neg      Social History     Socioeconomic History    Marital status:    Tobacco Use    Smoking status: Never    Smokeless tobacco: Never   Vaping Use    Vaping status: Never Used   Substance and Sexual Activity    Alcohol use: Never    Drug use: Never       Available pre-op labs reviewed.  Lab Results   Component Value Date    WBC 9.1 11/12/2024    RBC 5.47 (H) 11/12/2024    HGB 11.7 (L) 11/12/2024    HCT 36.8 11/12/2024    MCV 67.3 (L) 11/12/2024    MCH 21.4 (L) 11/12/2024    MCHC 31.8 11/12/2024    RDW 18.9 (H) 11/12/2024    .0 11/12/2024             Vital Signs:  Body mass index is 33.13 kg/m².   weight is 87.5 kg (193 lb). Her blood pressure is 121/80 and her pulse is 105.   Vitals:    11/12/24 1030 11/12/24 1146   BP: 121/80    Pulse: 105    Weight:  87.5 kg (193 lb)        Anesthesia Evaluation      Airway    Mallampati: I  TM distance: >3 FB  Neck ROM: full  Dental      Pulmonary - normal exam   Cardiovascular - normal exam    Neuro/Psych      GI/Hepatic/Renal      Endo/Other    (+) diabetes mellitus  Abdominal   (+) obese                 Anesthesia Plan:   ASA:  2  Emergent    Plan:   Epidural  Informed Consent Plan and Risks Discussed With:  Patient      I have informed Mamta Naqvi and/or legal guardian or family member of the nature of the anesthetic plan, benefits, risks including possible dental damage if relevant, major complications, and any alternative forms of anesthetic management.   All of the patient's questions were answered to the best of my ability. The patient desires the anesthetic management as planned.  MIRTA CUELLAR MD  2024 12:37 PM  Present on Admission:  **None**           [1]   Medications Prior to Admission   Medication Sig Dispense Refill Last Dose/Taking    cyanocobalamin 1000 MCG Oral Tab Take 1 tablet (1,000 mcg total) by mouth daily. 90 tablet 2 2024    Ferrous Sulfate 325 (65 Fe) MG Oral Tab Take 1 tablet (325 mg total) by mouth daily with food. 90 tablet 0 2024    Microlet Lancets Does not apply Misc Check blood sugars 4 times per day 400 each 0 2024    Glucose Blood (CONTOUR NEXT TEST) In Vitro Strip Check sugars 4 times a day 400 each 0 2024    prenatal vitamin with DHA 27-0.8-228 MG Oral Cap Take 1 capsule by mouth daily.   2024    [] Blood Glucose Monitoring Suppl (CONTOUR NEXT MONITOR) w/Device Does not apply Kit 1 kit one time for 1 dose. 1 kit 0     insulin aspart 100 Units/mL Injection Solution Inject 2 Units into the skin daily before lunch AND 2 Units Before Dinner. (Patient not taking: Reported on 2024) 10 mL 0    [2]   Current Facility-Administered Medications Ordered in Epic   Medication Dose Route Frequency Provider Last Rate Last Admin    dextrose in lactated ringers 5% infusion   Intravenous Continuous Hank  Barrett,         lactated ringers infusion   Intravenous PRN Barrett Mckinney,         lactated ringers IV bolus 500 mL  500 mL Intravenous PRN Barrett Mckinney DO        acetaminophen (Tylenol Extra Strength) tab 500 mg  500 mg Oral Q6H PRN Barrett Mckinney,         acetaminophen (Tylenol Extra Strength) tab 1,000 mg  1,000 mg Oral Q6H PRN Barrett Mckinney,         ibuprofen (Motrin) tab 600 mg  600 mg Oral Once PRN Barrett Mckinney,         ondansetron (Zofran) 4 MG/2ML injection 4 mg  4 mg Intravenous Q6H PRN Barrett Mckinney,         oxyTOCIN in sodium chloride 0.9% (Pitocin) 30 Units/500mL infusion premix  62.5-900 deanna-units/min Intravenous Continuous Barrett Mckinney DO        terbutaline (Brethine) 1 MG/ML injection 0.25 mg  0.25 mg Subcutaneous PRN Barrett Mckinney,         sodium citrate-citric acid (Bicitra) 500-334 MG/5ML oral solution 30 mL  30 mL Oral PRN Barrett Mckinney DO        lidocaine PF (Xylocaine-MPF) 1% injection  30 mL Intradermal Once Barrett Mckinney DO        fentaNYL (Sublimaze) 50 mcg/mL injection 50 mcg  50 mcg Intravenous Q30 Min PRN Barrett Mckinney DO        fentaNYL-bupivacaine 2 mcg/mL-0.125% in sodium chloride 0.9% 100 mL EPIDURAL infusion premix   Epidural Continuous Kristel Martinez MD        fentaNYL (Sublimaze) 50 mcg/mL injection 100 mcg  100 mcg Epidural Once Kristel Martinez MD        bupivacaine PF (Marcaine) 0.25% injection  20 mL Epidural Once Kristel Martinez MD        EPHEDrine (PF) 25 MG/5 ML injection 5 mg  5 mg Intravenous PRN Kristel Martinez MD        nalbuphine (Nubain) 10 mg/mL injection 2.5 mg  2.5 mg Intravenous Q15 Min PRN Kristel Martinez MD         No current Epic-ordered outpatient medications on file.   [3] No Known Allergies

## 2024-11-12 NOTE — PROGRESS NOTES
Pt is a 29 year old female admitted to 377/377-A.     Chief Complaint   Patient presents with    R/o Rom      Pt is  39w0d intra-uterine pregnancy.  History obtained, consents signed. Oriented to room, staff, and plan of care.

## 2024-11-12 NOTE — TRIAGE
[facility]  part of [facility]      Triage Note    Mamta Naqvi Patient Status:  Outpatient    1995 MRN [id number]   Location [facility] Attending Iman Lopez MD   Hosp Day # 0 PCP No primary care provider on file.      Para:   Estimated Date of Delivery: 24  Gestation: 39w0d    Chief Complaint    R/o Labor         Allergies:  Allergies[1]    No orders of the defined types were placed in this encounter.      Lab Results   Component Value Date    WBC 8.3 10/09/2024    HGB 10.4 (L) 10/09/2024    HCT 31.3 (L) 10/09/2024    .0 10/09/2024    CREATSERUM 0.50 (L) 2024    BUN 9 2024     2024    K 3.6 2024     2024    CO2 22.0 2024    GLU 85 2024    CA 8.9 2024    ALB 4.2 2024    ALKPHO 115 (H) 2024    BILT 0.5 2024    TP 6.9 2024    AST 20 2024    ALT 12 2024    B12 207 (L) 10/09/2024       Clinitek UA  Lab Results   Component Value Date    SPECGRAVITY 1.015 2024    URINECUL No Growth at 18-24 hrs. 2024       UA  Lab Results   Component Value Date    SPECGRAVITY 1.015 2024    NITRITE Negative 2024       Vitals:    24 0311 24   BP:  119/70   Pulse:  95   Weight: 87.5 kg (193 lb)        NST  Variability: Moderate           Accelerations: Yes           Decelerations: None            Baseline: 135 BPM           Uterine Irritability: No           Contractions: Regular                    Contraction Frequency: 4-6                   Acoustic Stimulator: No           Nonstress Test Interpretation: Reactive           Nonstress Test Second Interpretation: Reactive          FHR Category: Category I             Additional Comments Comments: Pt came in c/o contractions every 5 minuts x4 hours. Pt jose cruz every 4-6 mintues. SVE 1.5/70/-3. FHR category 1. Discussed with patient option to walk x2 hours and be  reexamined. Pt stated she preferred to go home and come back when contractions intensify. Dr. Lopez reviewed case. Order for discharge recieved. Pt. discharged home in stable condition accompanied by family member with written and verbal labor precautions. Pt verbalized understanding.     Chief Complaint   Patient presents with    R/o Labor     Pt reports contractions every 5 minutes since 1900         Carmen UGALDE RN  11/12/2024 3:42 AM      Diagnosis:  Latent labor  I have reviewed the NST and agree with the above findings and recommendations.   Iman Lopez MD    11/14/2024    1:50 PM          [1] No Known Allergies

## 2024-11-12 NOTE — PROGRESS NOTES
Patient having pain s/p epidural   Cervix 4/90/-2  Very tight introitus  Fht cat 1 with early decels   Tocos q2-3m on pitocin  ACs q6h   Will ask anesthesia to evaluate post epidural pain

## 2024-11-13 ENCOUNTER — TELEPHONE (OUTPATIENT)
Dept: OBGYN CLINIC | Facility: CLINIC | Age: 29
End: 2024-11-13

## 2024-11-13 LAB — GLUCOSE BLDC GLUCOMTR-MCNC: 115 MG/DL (ref 70–99)

## 2024-11-13 PROCEDURE — 0KQM0ZZ REPAIR PERINEUM MUSCLE, OPEN APPROACH: ICD-10-PCS | Performed by: OBSTETRICS & GYNECOLOGY

## 2024-11-13 PROCEDURE — 59400 OBSTETRICAL CARE: CPT | Performed by: OBSTETRICS & GYNECOLOGY

## 2024-11-13 RX ORDER — AMPICILLIN 2 G/1
INJECTION, POWDER, FOR SOLUTION INTRAVENOUS
Status: DISPENSED
Start: 2024-11-13 | End: 2024-11-13

## 2024-11-13 RX ORDER — ACETAMINOPHEN 500 MG
500 TABLET ORAL EVERY 6 HOURS PRN
Status: DISCONTINUED | OUTPATIENT
Start: 2024-11-13 | End: 2024-11-15

## 2024-11-13 RX ORDER — IBUPROFEN 600 MG/1
600 TABLET, FILM COATED ORAL EVERY 6 HOURS
Status: DISCONTINUED | OUTPATIENT
Start: 2024-11-13 | End: 2024-11-15

## 2024-11-13 RX ORDER — SIMETHICONE 80 MG
80 TABLET,CHEWABLE ORAL 3 TIMES DAILY PRN
Status: DISCONTINUED | OUTPATIENT
Start: 2024-11-13 | End: 2024-11-15

## 2024-11-13 RX ORDER — BISACODYL 10 MG
10 SUPPOSITORY, RECTAL RECTAL ONCE AS NEEDED
Status: DISCONTINUED | OUTPATIENT
Start: 2024-11-13 | End: 2024-11-15

## 2024-11-13 RX ORDER — ACETAMINOPHEN 500 MG
1000 TABLET ORAL EVERY 6 HOURS PRN
Status: DISCONTINUED | OUTPATIENT
Start: 2024-11-13 | End: 2024-11-15

## 2024-11-13 RX ORDER — GABAPENTIN 300 MG/1
300 CAPSULE ORAL 3 TIMES DAILY
Status: DISCONTINUED | OUTPATIENT
Start: 2024-11-13 | End: 2024-11-15

## 2024-11-13 RX ORDER — DOCUSATE SODIUM 100 MG/1
100 CAPSULE, LIQUID FILLED ORAL
Status: DISCONTINUED | OUTPATIENT
Start: 2024-11-13 | End: 2024-11-15

## 2024-11-13 RX ORDER — ONDANSETRON 2 MG/ML
4 INJECTION INTRAMUSCULAR; INTRAVENOUS EVERY 6 HOURS PRN
Status: DISCONTINUED | OUTPATIENT
Start: 2024-11-13 | End: 2024-11-15

## 2024-11-13 RX ORDER — CLINDAMYCIN PHOSPHATE 900 MG/50ML
900 INJECTION, SOLUTION INTRAVENOUS EVERY 8 HOURS
Status: COMPLETED | OUTPATIENT
Start: 2024-11-13 | End: 2024-11-13

## 2024-11-13 RX ORDER — AMMONIA INHALANTS 0.04 G/.3ML
0.3 INHALANT RESPIRATORY (INHALATION) AS NEEDED
Status: DISCONTINUED | OUTPATIENT
Start: 2024-11-13 | End: 2024-11-15

## 2024-11-13 NOTE — PROGRESS NOTES
Patient comfortable s/p redose of epidural  Cervix 4/100/ 0 to -1  IUPC placed  Fht cat 1  Titrate pitocin to 200mVUs  POC d/w patient and ; all questions answered

## 2024-11-13 NOTE — ANESTHESIA POSTPROCEDURE EVALUATION
Patient: Mamta Naqvi    Procedure Summary       Date: 11/12/24 Room / Location:     Anesthesia Start: 1225 Anesthesia Stop: 11/13/24 0224    Procedure: LABOR ANALGESIA Diagnosis:     Scheduled Providers:  Anesthesiologist: Kristel Martinez MD    Anesthesia Type: epidural ASA Status: 2 - Emergent            Anesthesia Type: epidural    Vitals Value Taken Time   /78 11/13/24 0322   Temp  11/13/24 0350   Pulse 87 11/13/24 0322   Resp  11/13/24 0350   SpO2  11/13/24 0350       [facility] AN Post Evaluation:   Patient Evaluated in PACU  Patient Participation: complete - patient participated  Level of Consciousness: awake  Pain Management: adequate  Airway Patency:patent  Dental exam unchanged from preop  Yes    Cardiovascular Status: acceptable  Respiratory Status: acceptable  Postoperative Hydration acceptable      KRISTEL MARTINEZ MD  11/13/2024 3:50 AM

## 2024-11-13 NOTE — LACTATION NOTE
11/13/24 1634   Evaluation Type   Evaluation Type Inpatient   Problems identified   Problems identified Knowledge deficit;Flat nipple(s);Unable to acheive sustained latch;Milk supply WNL   Maternal history   Maternal history Gestational diabetes   Breastfeeding goal   Breastfeeding goal To maintain breast milk feeding per patient goal   Maternal Assessment   Bilateral Breasts Wide spaced;Elastic  (Large)   Bilateral Nipples Flat;Colostrum easily expressed   Prior breastfeeding experience (comment below) Primip   Breastfeeding Assistance Breastfeeding assistance provided with permission;Breast exam provided with permission   Pain assessment   Treatment of Sore Nipples Deeper latch techniques   Guidelines for use of:   Breast pump type Hand Pump   Reported pumping volumes (ml) 20   Other (comment) Mom having difficulty with positioning due to large breast size. Assisted with multiple positions, utlimately laid back was most effective although baby uncoordinated with frequent pop-offs, flat nipple dislocates from mouth after few sucks. Encouraged use of hand pump to offer EBM, reviewed BM storage and appropriate volumes to offer.

## 2024-11-13 NOTE — LACTATION NOTE
This note was copied from a baby's chart.     11/13/24 5292   Evaluation Type   Evaluation Type Inpatient   Problems & Assessment   Problems Diagnosed or Identified Latch difficulty;Shallow latch;Disorganized suck   Infant Assessment Hunger cues present   Muscle tone Appropriate for GA   Feeding Assessment   Summary Current Feeding Breastfeeding exclusively   Breastfeeding Assessment Assisted with breastfeeding w/mother's permission;No sustained latch to breast  (Uncoordinated sucking pattern, pops off and spits out nipple after few sucks.)   Breastfeeding Positions right breast;left breast;cradle;football;laid back   Latch 1   Audible Sucks/Swallows 1   Type of Nipple 1   Comfort (Breast/Nipple) 2   Hold (Positioning) 0   LATCH Score 5

## 2024-11-13 NOTE — L&D DELIVERY NOTE
Donya, Girl [[id number]]      Labor Events     labor?: No   steroids?: None  Antibiotics received during labor?: No  Rupture date/time: 2024 0820     Rupture type: SROM  Fluid color: Clear  Labor type: Spontaneous Onset of Labor  Augmentation: Oxytocin  Indications for augmentation: Ineffective Contraction Pattern  Intrapartum & labor complications: Temp 100.4 or greater, None  Intrapartum & labor complications comment: gdm       Labor Event Times    Dilation complete date/time: 2024  Start pushing date/time: 2024        Presentation    Presentation: Vertex  Position: Left Occiput Anterior       Operative Delivery    Operative Vaginal Delivery: No                Shoulder Dystocia    Shoulder Dystocia: No       Anesthesia    Method: Epidural               Delivery      Head delivery date/time: 2024 02:19:30   Delivery date/time:  24 02:20:00   Delivery type: Normal spontaneous vaginal delivery    Details:     Delivery location: delivery room       Delivery Providers    Delivering Clinician: Barrett Mckinney DO   Delivery personnel:  Provider Role   Andra Armstrong RN Baby Nurse   Maribeth De Oliveira RN Delivery Nurse   Negrita Coles Technician   Eugenia Gonzalez MD Obstetrician   Josefina Montiel, RN Registered Nurse             Cord    Vessels: 3 Vessels  Complications: Nuchal  # of loops: 1  Timed cord clamping: No  Cord blood disposition: to lab  Gases sent?: Yes        Measurements      Weight: 3110 g 6 lb 13.7 oz Length: 53.3 cm     Head circum.: 35 cm              Placenta    Date/time: 2024 0224  Removal: Spontaneous  Appearance: Intact  Disposition: Pathology       Apgars    Living status: Living   Apgar Scoring Key:    0 1 2    Skin color Blue or pale Acrocyanotic Completely pink    Heart rate Absent <100 bpm >100 bpm    Reflex irritability No response Grimace Cry or active withdrawal    Muscle tone  Limp Some flexion Active motion    Respiratory effort Absent Weak cry; hypoventilation Good, crying              1 Minute:  5 Minute:  10 Minute:  15 Minute:  20 Minute:      Skin color: 0  1       Heart rate: 2  2       Reflex irritablity: 2  2       Muscle tone: 1  2       Respiratory effort: 1  2       Total: 6  9          Apgars assigned by: HANNA REYNOLDS   disposition: with mother       Skin to Skin    Skin to skin initiated date/time: 2024 0240  Skin to skin with: Mother       Vaginal Count    Initial count RN: Maribeth De Oliveira RN  Initial count Tech: Coles, Negrita   Sponges   Sharps    Initial counts 10   0    Final counts 30   8           Lacerations    No data filed              [facility]  part of [facility]    Vaginal Delivery Note    Mamta Naqvi Patient Status:  Inpatient    1995 MRN [id number]   Location [facility] Attending Barrett Mckinney DO   Hosp Day # 1 PCP No primary care provider on file.     Delivery     Infant  Date of Delivery: 2024   Time of Delivery: 2:20 AM  Delivery Type: Normal spontaneous vaginal delivery    Infant Sex  Information for the patient's :  Donya, Girl [[id number]]   female    Infant Birthweight  Information for the patient's :  Donya Girl [[id number]]   6 lb 13.7 oz (3.11 kg)     Presentation Vertex [1]  Position Left [1] Occiput [1] Anterior [1]    Apgars:  1 minute: 6               5 minutes: 9                     Neonatologist Present: no      Placenta  Date/Time of Delivery: 2024  2:24 AM   Delivery: spontaneous  Placenta to Pathology: yes      Cord Gases Submitted: yes  Cord Complications: single nuchal    Maternal Anesthesia: epidural, local, and IV sedation     Episiotomy/Laceration Repair  Deep b/l sulcus with deep 2nd degree repaired in multiple layers to close spaces with 2/0 and 3/0 vicryl    Delivery Complications  none    Quantitative Blood Loss  (mL)        EBL: 400 cc    Delivery Comment:     Baby MERCEDEZ.  Compound presentation right arm/hand draped across face/chest and tight nuchal.  I had to clamp and cut the cord before delivery of the baby.  Compound presentation reduced and then  Shoulders delivered atraumatically followed by the trunk and LE. Infant with reduced tone so brought to RNs waiting. . Baby handed to the awaiting nursing personal. Placenta delivered by controlled cord traction intact with a 3 VC and intact. Normal uterine tone with oxytocin infusion. Extensive repair with b/l deep sulcus and deep 2nd degree.  I had to call the in house Dr Gonzalez, to assit with retraction and exposure.  Repair as above with good hemostasis and anatomic re approximation. Sphincter intact.  Rectum and vagina clear of sponges/suture after digital exam. Given raw edges and extensive repair, I did place surgicell powder in vagina and one 4x4 packing, which we will leave in for 12-24 hours.  [place] and patient stable in the delivery room with nursing present. Sponge and needle counts verified with RNs present.  Delivery findings d/w patient and .        Barrett Mckinney DO   2024  3:44 AM

## 2024-11-13 NOTE — PAYOR COMM NOTE
--------------  ADMISSION REVIEW     Payor: [employer] CHOICE/HMO/POS/EPO  Subscriber #:  [id number]  Authorization Number: [id number]    Admit date: 24  Admit time: 11:01 AM       History & Physical     Mamta Naqvi is a 29 year old  at 39w0d with 2024, by Last Menstrual Period who presents c/o leakage of fluid since 830am.  [facility] to [facility] and sent home in latent labor.  Confirmed ROM with +pooling/ferning/nitrazine per triage RN.  Cervix at admission /-2.  Confirmed vertex by in house OB.                         OB History    Para Term  AB Living   2       1     SAB IAB Ectopic Multiple Live Births      1                  # Outcome Date GA Lbr Abel/2nd Weight Sex Type Anes PTL Lv   2 Current                     1 SAB 2023 8w0d                    Birth Comments: D&C x 2      Past Medical History:   Past Medical History       Past Medical History:    Gestational diabetes (HCC)    History of varicella         Past Social History:   Past Surgical History        Past Surgical History:   Procedure Date    Other surgical history       D&C x 2 per pt in ; no bleeding complications                 Medications Prior to Admission   Medication Sig Dispense Refill Last Dose/Taking    cyanocobalamin 1000 MCG Oral Tab Take 1 tablet (1,000 mcg total) by mouth daily. 90 tablet 2 2024    Ferrous Sulfate 325 (65 Fe) MG Oral Tab Take 1 tablet (325 mg total) by mouth daily with food. 90 tablet 0 2024    Microlet Lancets Does not apply Misc Check blood sugars 4 times per day 400 each 0 2024    Glucose Blood (CONTOUR NEXT TEST) In Vitro Strip Check sugars 4 times a day 400 each 0 2024    prenatal vitamin with DHA 27-0.8-228 MG Oral Cap Take 1 capsule by mouth daily.     2024    [] Blood Glucose Monitoring Suppl (CONTOUR NEXT MONITOR) w/Device Does not apply Kit 1 kit one time for 1 dose. 1 kit 0       insulin aspart 100 Units/mL Injection Solution Inject 2 Units into the skin daily before lunch AND 2 Units Before Dinner. (Patient not taking: Reported on 2024) 10 mL 0        OBJECTIVE:     Temp:  [97.8 °F (36.6 °C)] 97.8 °F (36.6 °C)  Pulse:  [] 107  BP: (109-129)/(65-84) 120/84     General: Alert and Oriented  Abdomen: Soft, Gravid NT Uterus  Leopolds: 7#     Cervix: 3/-2  Presentation: vertex     FHT: cat 1  TOCOS: q5m s/p epidural        ASSESSMENT:     Patient is a  at 39w0d presents SROM      PLAN:     Admit  Cefm/toco  FHTs reassuring   GBS neg  Labor: start pitocin augmentation given UCs spacing s/p epidural  H2ASQ--EHv q6h  Analgesia: s/p epidural  All questions answered; pt voices understanding and agrees to proceed with POC       :    Vaginal Delivery Note      Delivery      Infant  Date of Delivery: 2024   Time of Delivery: 2:20 AM  Delivery Type: Normal spontaneous vaginal delivery     Infant Sex  Information for the patient's :  Donya, Girl [[id number]]   female     Infant Birthweight  Information for the patient's :  Donya, Girl [[id number]]   6 lb 13.7 oz (3.11 kg)      Presentation Vertex [1]  Position Left [1] Occiput [1] Anterior [1]     Apgars:  1 minute: 6               5 minutes: 9

## 2024-11-13 NOTE — DISCHARGE SUMMARY
[facility]  part of [facility]    Discharge Summary    Mamta Naqvi Patient Status:  Inpatient    1995 MRN [id number]   Location [facility] Attending Barrett Mckinney, DO   Hosp Day # 3       Delivering OB Clinician: Dr. Mckinney    EDC: Estimated Date of Delivery: 24    Gestational Age: 39w1d    Antepartum complications:   Patient Active Problem List   Diagnosis    RBC microcytosis    Beta thalassemia trait    GDM (gestational diabetes mellitus) (HCC)    Anemia during pregnancy in third trimester (HCC)    Pregnancy (HCC)    Anemia associated with acute blood loss       Date of Delivery: 2024 Time of Delivery: 2:20 AM    Delivery Type: spontaneous vaginal delivery    Baby: Liveborn male   Information for the patient's :  Donya Girl [[id number]]   6 lb 13.7 oz (3.11 kg)  Apgars:  1 minute: 6  5 minutes: 910 minutes:       Intrapartum Complications: Chorio    Admission date: 24  Discharge Date: 11-15-24    Hospital Course: 30 yo  @ 39w0d presents with SROM. Pitocin IOL.  Pushed x 2 hours to .  Deep b/l sulcus and deep 2nd degree repaired with 2/0 and 3/0 vicryl in multiple layers. Routine delivery and postpartum care    Discharge Physical Exam:   /79 (BP Location: Right arm)   Pulse 83   Temp 98.2 °F (36.8 °C) (Oral)   Resp 15   Wt 193 lb (87.5 kg)   LMP 2024 (Exact Date)   Breastfeeding Yes   BMI 33.13 kg/m²   General appearance:  alert, appears stated age, cooperative and no distress  Abdominal: soft, non-tender, no rebound  Uterus: firm, nontender, below umbilicus  Pelvic: deferred  Extremities: Homans sign is negative, no sign of DVT    Discharged Condition: stable    Disposition: home      Plan:     Follow-up appointment in 6 weeks with Dr. Mckinney

## 2024-11-14 ENCOUNTER — TELEPHONE (OUTPATIENT)
Dept: OBGYN CLINIC | Facility: CLINIC | Age: 29
End: 2024-11-14

## 2024-11-14 LAB
BASOPHILS # BLD AUTO: 0.02 X10(3) UL (ref 0–0.2)
BASOPHILS NFR BLD AUTO: 0.2 %
DEPRECATED RDW RBC AUTO: 42.3 FL (ref 35.1–46.3)
EOSINOPHIL # BLD AUTO: 0.04 X10(3) UL (ref 0–0.7)
EOSINOPHIL NFR BLD AUTO: 0.3 %
ERYTHROCYTE [DISTWIDTH] IN BLOOD BY AUTOMATED COUNT: 18.2 % (ref 11–15)
HCT VFR BLD AUTO: 24.9 %
HGB BLD-MCNC: 8.5 G/DL
IMM GRANULOCYTES # BLD AUTO: 0.1 X10(3) UL (ref 0–1)
IMM GRANULOCYTES NFR BLD: 0.8 %
LYMPHOCYTES # BLD AUTO: 2.16 X10(3) UL (ref 1–4)
LYMPHOCYTES NFR BLD AUTO: 17.6 %
MCH RBC QN AUTO: 23.1 PG (ref 26–34)
MCHC RBC AUTO-ENTMCNC: 34.1 G/DL (ref 31–37)
MCV RBC AUTO: 67.7 FL
MONOCYTES # BLD AUTO: 0.72 X10(3) UL (ref 0.1–1)
MONOCYTES NFR BLD AUTO: 5.9 %
NEUTROPHILS # BLD AUTO: 9.25 X10 (3) UL (ref 1.5–7.7)
NEUTROPHILS # BLD AUTO: 9.25 X10(3) UL (ref 1.5–7.7)
NEUTROPHILS NFR BLD AUTO: 75.2 %
PLATELET # BLD AUTO: 210 10(3)UL (ref 150–450)
RBC # BLD AUTO: 3.68 X10(6)UL
WBC # BLD AUTO: 12.3 X10(3) UL (ref 4–11)

## 2024-11-14 RX ORDER — CHOLECALCIFEROL (VITAMIN D3) 25 MCG
1 TABLET,CHEWABLE ORAL DAILY
Status: DISCONTINUED | OUTPATIENT
Start: 2024-11-14 | End: 2024-11-15

## 2024-11-14 NOTE — TELEPHONE ENCOUNTER
Patient needs a 6 week post partum near 12/25. No current openings. Please call patient to schedule.

## 2024-11-14 NOTE — LACTATION NOTE
11/14/24 1137   Evaluation Type   Evaluation Type Inpatient   Problems identified   Problems identified Knowledge deficit;Unable to acheive sustained latch;Milk supply not WNL   Milk supply not WNL Reduced (potential)   Problems Identified Other Formula supplement initiated overnight per Mom's request   Breastfeeding goal   Breastfeeding goal To maintain breast milk feeding per patient goal   Maternal Assessment   Bilateral Breasts Wide spaced;Elastic   Bilateral Nipples Flat   Prior breastfeeding experience (comment below) Primip   Breastfeeding Assistance Pumping assistance provided with permission;Breastfeeding assistance provided with permission   Guidelines for use of:   Equipment Nipple shield  (Introduced, used briefly then removed per mom's request)   Breast pump type Hand Pump;Ameda Platinum;Other  (Ann breast pump ordered via insurance)   Suggested use of pump Pump if infant is not latching to breast;Pump each time a supplement is offered   Reported pumping volumes (ml) 0   Other (comment) Mom able to latch infant independently, minor position adjustments made to achieve deeper latch. Infant able to sustain latch with nutritive sucking pattern observed. Discussed indications for pumping, appropriate volumes to offer and pumping recommendations reviewed. Breast pump initiated, instructed on pump settings, cleaning parts and BM storage.

## 2024-11-14 NOTE — PROGRESS NOTES
[facility]  part of [facility]    Post  Progress Note      Mamta Naqvi Patient Status:  Inpatient    1995 MRN [id number]   Location [facility] Attending Barrett Mckinney, DO   Hosp Day # 2 PCP No primary care provider on file.       Subjective     Good pain control.  Sore.       Objective   Vital signs in last 24 hours:  Temp:  [97.8 °F (36.6 °C)-98.1 °F (36.7 °C)] 98 °F (36.7 °C)  Pulse:  [] 97  Resp:  [16] 16  BP: ()/(59-93) 130/93    Physical Exam:  Constitutional: comfortable  Abdomen: soft, nontender  Uterus: fundus firm and nontender  Extremities: No calf tenderness      Results:   CBC:    Lab Results   Component Value Date    WBC 12.3 (H) 2024    WBC 9.1 2024    WBC 8.3 10/09/2024     Lab Results   Component Value Date    HGB 8.5 (L) 2024    HGB 11.7 (L) 2024    HGB 10.4 (L) 10/09/2024      Lab Results   Component Value Date    .0 2024    .0 2024    .0 10/09/2024        Assessment/Plan   PPD #  1 --- stable  Chorio-- gent/clinda x 24 hrs.  Afebrile  A1GDM    Routine PP care          Iman Lopez MD  2024  9:43 AM

## 2024-11-14 NOTE — PLAN OF CARE
Problem: Patient Centered Care  Goal: Patient preferences are identified and integrated in the patient's plan of care  Description: Interventions:  - What would you like us to know as we care for you?   - Provide timely, complete, and accurate information to patient/family  - Incorporate patient and family knowledge, values, beliefs, and cultural backgrounds into the planning and delivery of care  - Encourage patient/family to participate in care and decision-making at the level they choose  - Honor patient and family perspectives and choices  Outcome: Progressing     Problem: Patient/Family Goals  Goal: Patient/Family Long Term Goal  Description: Patient's Long Term Goal:     Interventions:  -   - See additional Care Plan goals for specific interventions  Outcome: Progressing  Goal: Patient/Family Short Term Goal  Description: Patient's Short Term Goal:     Interventions:   -   - See additional Care Plan goals for specific interventions  Outcome: Progressing     Problem: POSTPARTUM  Goal: Long Term Goal:Experiences normal postpartum course  Description: INTERVENTIONS:  - Assess and monitor vital signs and lab values.  - Assess fundus and lochia.  - Provide ice/sitz baths for perineum discomfort.  - Monitor healing of incision/episiotomy/laceration, and assess for signs and symptoms of infection and hematoma.  - Assess bladder function and monitor for bladder distention.  - Provide/instruct/assist with pericare as needed.  - Provide VTE prophylaxis as needed.  - Monitor bowel function.  - Encourage ambulation and provide assistance as needed.  - Assess and monitor emotional status and provide social service/psych resources as needed.  - Utilize standard precautions and use personal protective equipment as indicated. Ensure aseptic care of all intravenous lines and invasive tubes/drains.  - Obtain immunization and exposure to communicable diseases history.  Outcome: Progressing  Goal: Optimize infant feeding at the  breast  Description: INTERVENTIONS:  - Initiate breast feeding within first hour after birth.   - Monitor effectiveness of current breast feeding efforts.  - Assess support systems available to mother/family.  - Identify cultural beliefs/practices regarding lactation, letdown techniques, maternal food preferences.  - Assess mother's knowledge and previous experience with breast feeding.  - Provide information as needed about early infant feeding cues (e.g., rooting, lip smacking, sucking fingers/hand) versus late cue of crying.  - Discuss/demonstrate breast feeding aids (e.g., infant sling, nursing footstool/pillows, and breast pumps).  - Encourage mother/other family members to express feelings/concerns, and actively listen.  - Educate father/SO about benefits of breast feeding and how to manage common lactation challenges.  - Recommend avoidance of specific medications or substances incompatible with breast feeding.  - Assess and monitor for signs of nipple pain/trauma.  - Instruct and provide assistance with proper latch.  - Review techniques for milk expression (breast pumping) and storage of breast milk. Provide pumping equipment/supplies, instructions and assistance, as needed.  - Encourage rooming-in and breast feeding on demand.  - Encourage skin-to-skin contact.  - Provide LC support as needed.  - Assess for and manage engorgement.  - Provide breast feeding education handouts and information on community breast feeding support.   Outcome: Progressing  Goal: Establishment of adequate milk supply with medication/procedure interruptions  Description: INTERVENTIONS:  - Review techniques for milk expression (breast pumping).   - Provide pumping equipment/supplies, instructions, and assistance until it is safe to breastfeed infant.  Outcome: Progressing  Goal: Experiences normal breast weaning course  Description: INTERVENTIONS:  - Assess for and manage engorgement.  - Instruct on breast care.  - Provide comfort  measures.  Outcome: Progressing  Goal: Appropriate maternal -  bonding  Description: INTERVENTIONS:  - Assess caregiver- interactions.  - Assess caregiver's emotional status and coping mechanisms.  - Encourage caregiver to participate in  daily care.  - Assess support systems available to mother/family.  - Provide /case management support as needed.  Outcome: Progressing     VSS, afebrile. Voiding freely. Fundus is firm, U/U, bleeding is small. Plan of care reviewed with pt. Questions and concerns answered. Bonding well with baby. Will continue with plan of care.

## 2024-11-14 NOTE — TELEPHONE ENCOUNTER
Patient scheduled for postpartum visit on 12/26 w/Dr. Mckinney. Left detailed message on private voicemail that appointment was booked.

## 2024-11-15 VITALS
BODY MASS INDEX: 33 KG/M2 | TEMPERATURE: 98 F | WEIGHT: 193 LBS | HEART RATE: 83 BPM | SYSTOLIC BLOOD PRESSURE: 129 MMHG | DIASTOLIC BLOOD PRESSURE: 79 MMHG | RESPIRATION RATE: 15 BRPM

## 2024-11-15 PROBLEM — D62 ANEMIA ASSOCIATED WITH ACUTE BLOOD LOSS: Status: ACTIVE | Noted: 2024-11-15

## 2024-11-15 RX ORDER — PSEUDOEPHEDRINE HCL 30 MG
100 TABLET ORAL 2 TIMES DAILY
Qty: 30 CAPSULE | Refills: 0 | Status: SHIPPED | OUTPATIENT
Start: 2024-11-15

## 2024-11-15 RX ORDER — ACETAMINOPHEN 500 MG
1000 TABLET ORAL EVERY 6 HOURS PRN
Qty: 30 TABLET | Refills: 0 | Status: SHIPPED | OUTPATIENT
Start: 2024-11-15

## 2024-11-15 RX ORDER — IBUPROFEN 600 MG/1
600 TABLET, FILM COATED ORAL EVERY 6 HOURS PRN
Qty: 20 TABLET | Refills: 0 | Status: SHIPPED | OUTPATIENT
Start: 2024-11-15

## 2024-11-15 NOTE — PROGRESS NOTES
Post-Partum Note   11/15/2024, 9:19 AM    Subjective:  Patient doing well. Pain moderate.  Lochia is small.  She reports she does tolerate a regular diet.  She denies headache, fever, chest pain, shortness of breath, and leg pain.  She has ambulated and denies lightheadedness. Main complaint of pain is thoraco- lumbar back. She did have 3 hr second stage. The patient is breast and bottle feeding  Patient had infant girl.     Objective:  Vitals:    24 0900 24 1036 24 2116 11/15/24 0814   BP: (!) 130/93 114/76 111/85 129/79   BP Location:   Right arm Right arm   Pulse: 97 98 100 83   Resp: 16  16 15   Temp: 98 °F (36.7 °C)  98 °F (36.7 °C) 98.2 °F (36.8 °C)   TempSrc: Oral  Oral Oral   Weight:         No intake or output data in the 24 hours ending 11/15/24 0919      PE:  General: A&Ox3. NAD  Abdomen:Soft and nontender; fundus firm below umbilicus and nontender   Extremities:  no calf tenderness    Data:   Recent Labs     24  1123 24  0653   WBC 9.1 12.3*   HGB 11.7* 8.5*   HCT 36.8 24.9*       Assessment/Plan:  29 year oldyo  , s/p spontaneous vaginal, PPD# 2      Disposition: Continue routine PP care- Plan on DC home PP day 2    We discussed anemia and she'll restart Fe daily in 2 weeks after bowels are well established.    Yemi Ng,  11/15/2024 9:19 AM

## 2024-11-15 NOTE — PLAN OF CARE
Problem: Patient Centered Care  Goal: Patient preferences are identified and integrated in the patient's plan of care  Description: Interventions:  - What would you like us to know as we care for you?   - Provide timely, complete, and accurate information to patient/family  - Incorporate patient and family knowledge, values, beliefs, and cultural backgrounds into the planning and delivery of care  - Encourage patient/family to participate in care and decision-making at the level they choose  - Honor patient and family perspectives and choices  Outcome: Progressing     Problem: Patient/Family Goals  Goal: Patient/Family Long Term Goal  Description: Patient's Long Term Goal:     Interventions:  -   - See additional Care Plan goals for specific interventions  Outcome: Progressing  Goal: Patient/Family Short Term Goal  Description: Patient's Short Term Goal:     Interventions:   -   - See additional Care Plan goals for specific interventions  Outcome: Progressing     Problem: POSTPARTUM  Goal: Long Term Goal:Experiences normal postpartum course  Description: INTERVENTIONS:  - Assess and monitor vital signs and lab values.  - Assess fundus and lochia.  - Provide ice/sitz baths for perineum discomfort.  - Monitor healing of incision/episiotomy/laceration, and assess for signs and symptoms of infection and hematoma.  - Assess bladder function and monitor for bladder distention.  - Provide/instruct/assist with pericare as needed.  - Provide VTE prophylaxis as needed.  - Monitor bowel function.  - Encourage ambulation and provide assistance as needed.  - Assess and monitor emotional status and provide social service/psych resources as needed.  - Utilize standard precautions and use personal protective equipment as indicated. Ensure aseptic care of all intravenous lines and invasive tubes/drains.  - Obtain immunization and exposure to communicable diseases history.  Outcome: Progressing  Goal: Optimize infant feeding at the  breast  Description: INTERVENTIONS:  - Initiate breast feeding within first hour after birth.   - Monitor effectiveness of current breast feeding efforts.  - Assess support systems available to mother/family.  - Identify cultural beliefs/practices regarding lactation, letdown techniques, maternal food preferences.  - Assess mother's knowledge and previous experience with breast feeding.  - Provide information as needed about early infant feeding cues (e.g., rooting, lip smacking, sucking fingers/hand) versus late cue of crying.  - Discuss/demonstrate breast feeding aids (e.g., infant sling, nursing footstool/pillows, and breast pumps).  - Encourage mother/other family members to express feelings/concerns, and actively listen.  - Educate father/SO about benefits of breast feeding and how to manage common lactation challenges.  - Recommend avoidance of specific medications or substances incompatible with breast feeding.  - Assess and monitor for signs of nipple pain/trauma.  - Instruct and provide assistance with proper latch.  - Review techniques for milk expression (breast pumping) and storage of breast milk. Provide pumping equipment/supplies, instructions and assistance, as needed.  - Encourage rooming-in and breast feeding on demand.  - Encourage skin-to-skin contact.  - Provide LC support as needed.  - Assess for and manage engorgement.  - Provide breast feeding education handouts and information on community breast feeding support.   Outcome: Progressing  Goal: Establishment of adequate milk supply with medication/procedure interruptions  Description: INTERVENTIONS:  - Review techniques for milk expression (breast pumping).   - Provide pumping equipment/supplies, instructions, and assistance until it is safe to breastfeed infant.  Outcome: Progressing  Goal: Experiences normal breast weaning course  Description: INTERVENTIONS:  - Assess for and manage engorgement.  - Instruct on breast care.  - Provide comfort  measures.  Outcome: Progressing  Goal: Appropriate maternal -  bonding  Description: INTERVENTIONS:  - Assess caregiver- interactions.  - Assess caregiver's emotional status and coping mechanisms.  - Encourage caregiver to participate in  daily care.  - Assess support systems available to mother/family.  - Provide /case management support as needed.  Outcome: Progressing     VSS, afebrile. Voiding freely. Fundus is firm, U/U, bleeding is scant. Plan of care reviewed with pt. Questions and concerns answered. Bonding well with baby. Will continue with plan of care.

## 2024-11-15 NOTE — PLAN OF CARE
Problem: Patient Centered Care  Goal: Patient preferences are identified and integrated in the patient's plan of care  Description: Interventions:  - What would you like us to know as we care for you?   - Provide timely, complete, and accurate information to patient/family  - Incorporate patient and family knowledge, values, beliefs, and cultural backgrounds into the planning and delivery of care  - Encourage patient/family to participate in care and decision-making at the level they choose  - Honor patient and family perspectives and choices  Outcome: Completed     Problem: Patient/Family Goals  Goal: Patient/Family Long Term Goal  Description: Patient's Long Term Goal:     Interventions:  -   - See additional Care Plan goals for specific interventions  Outcome: Completed  Goal: Patient/Family Short Term Goal  Description: Patient's Short Term Goal:     Interventions:   -   - See additional Care Plan goals for specific interventions  Outcome: Completed     Problem: POSTPARTUM  Goal: Long Term Goal:Experiences normal postpartum course  Description: INTERVENTIONS:  - Assess and monitor vital signs and lab values.  - Assess fundus and lochia.  - Provide ice/sitz baths for perineum discomfort.  - Monitor healing of incision/episiotomy/laceration, and assess for signs and symptoms of infection and hematoma.  - Assess bladder function and monitor for bladder distention.  - Provide/instruct/assist with pericare as needed.  - Provide VTE prophylaxis as needed.  - Monitor bowel function.  - Encourage ambulation and provide assistance as needed.  - Assess and monitor emotional status and provide social service/psych resources as needed.  - Utilize standard precautions and use personal protective equipment as indicated. Ensure aseptic care of all intravenous lines and invasive tubes/drains.  - Obtain immunization and exposure to communicable diseases history.  Outcome: Completed  Goal: Optimize infant feeding at the  breast  Description: INTERVENTIONS:  - Initiate breast feeding within first hour after birth.   - Monitor effectiveness of current breast feeding efforts.  - Assess support systems available to mother/family.  - Identify cultural beliefs/practices regarding lactation, letdown techniques, maternal food preferences.  - Assess mother's knowledge and previous experience with breast feeding.  - Provide information as needed about early infant feeding cues (e.g., rooting, lip smacking, sucking fingers/hand) versus late cue of crying.  - Discuss/demonstrate breast feeding aids (e.g., infant sling, nursing footstool/pillows, and breast pumps).  - Encourage mother/other family members to express feelings/concerns, and actively listen.  - Educate father/SO about benefits of breast feeding and how to manage common lactation challenges.  - Recommend avoidance of specific medications or substances incompatible with breast feeding.  - Assess and monitor for signs of nipple pain/trauma.  - Instruct and provide assistance with proper latch.  - Review techniques for milk expression (breast pumping) and storage of breast milk. Provide pumping equipment/supplies, instructions and assistance, as needed.  - Encourage rooming-in and breast feeding on demand.  - Encourage skin-to-skin contact.  - Provide LC support as needed.  - Assess for and manage engorgement.  - Provide breast feeding education handouts and information on community breast feeding support.   Outcome: Completed  Goal: Establishment of adequate milk supply with medication/procedure interruptions  Description: INTERVENTIONS:  - Review techniques for milk expression (breast pumping).   - Provide pumping equipment/supplies, instructions, and assistance until it is safe to breastfeed infant.  Outcome: Completed  Goal: Experiences normal breast weaning course  Description: INTERVENTIONS:  - Assess for and manage engorgement.  - Instruct on breast care.  - Provide comfort  measures.  Outcome: Completed  Goal: Appropriate maternal -  bonding  Description: INTERVENTIONS:  - Assess caregiver- interactions.  - Assess caregiver's emotional status and coping mechanisms.  - Encourage caregiver to participate in  daily care.  - Assess support systems available to mother/family.  - Provide /case management support as needed.  Outcome: Completed

## 2024-11-16 ENCOUNTER — TELEPHONE (OUTPATIENT)
Dept: OBGYN CLINIC | Facility: CLINIC | Age: 29
End: 2024-11-16

## 2024-11-16 NOTE — TELEPHONE ENCOUNTER
Mamta delivered  by  with Dr. Mckinney.   Arrived at  for peds appointment today, reporting swelling to bilateral leg.    Call placed to patient to triage.   Swelling at hospital, but now worsening.   Denies HA, RUQ pain, denies dizziness or vision changes.  No hx of elevated BP or HTN.     She has not tried anything for swelling.   Discussed postpartum edema very common.   Reviewed elevating legs to above heart level if sitting or lying down.   Decrease sodium in diet and push hydration.   Recommendations given for compression stockings during waking hours.     Reviewed to call Monday if no improvement in swelling or any other s/s of preeclampsia discussed. Patient verbalized understanding.

## 2024-11-26 ENCOUNTER — NST DOCUMENTATION (OUTPATIENT)
Dept: OBGYN CLINIC | Facility: CLINIC | Age: 29
End: 2024-11-26

## 2024-11-26 NOTE — NST
Nonstress Test   Patient: Mamta Naqvi    11/7/24    Diagnosis from order: Diet controlled gestational diabetes mellitus (GDM) in third trimester ([facility])    Problem List:   Patient Active Problem List   Diagnosis    RBC microcytosis    Beta thalassemia trait    GDM (gestational diabetes mellitus) ([facility])    Anemia during pregnancy in third trimester ([facility])    Pregnancy ([facility])    Anemia associated with acute blood loss       NST: 125/mod/R      Barrett Mckinney DO  11/26/2024  1:07 PM

## 2024-12-04 ENCOUNTER — TELEPHONE (OUTPATIENT)
Dept: OBGYN UNIT | Facility: HOSPITAL | Age: 29
End: 2024-12-04

## 2024-12-05 ENCOUNTER — APPOINTMENT (OUTPATIENT)
Dept: HEMATOLOGY/ONCOLOGY | Facility: HOSPITAL | Age: 29
End: 2024-12-05
Attending: INTERNAL MEDICINE
Payer: COMMERCIAL

## 2024-12-23 ENCOUNTER — POSTPARTUM (OUTPATIENT)
Dept: OBGYN CLINIC | Facility: CLINIC | Age: 29
End: 2024-12-23

## 2024-12-23 VITALS
WEIGHT: 175 LBS | DIASTOLIC BLOOD PRESSURE: 79 MMHG | HEART RATE: 97 BPM | SYSTOLIC BLOOD PRESSURE: 117 MMHG | BODY MASS INDEX: 30 KG/M2

## 2024-12-23 RX ORDER — ACETAMINOPHEN AND CODEINE PHOSPHATE 120; 12 MG/5ML; MG/5ML
0.35 SOLUTION ORAL DAILY
Qty: 3 EACH | Refills: 1 | Status: SHIPPED | OUTPATIENT
Start: 2024-12-23 | End: 2025-12-23

## 2024-12-23 NOTE — PROGRESS NOTES
HPI    Mamta Naqvi is a 29 year old female  here for 6 week post-partum visit.  Patient is breast feeding.   Patient denies symptoms of depression, [place]  depression score below.  Bonding well with baby.  NO SI/HI.  Moving bowels and bladder w/o issues.  Lochia present.      [place]  DEPRESSION SCALE  I have been able to laugh and see the funny side of things: As much as I always could  I have looked forward with enjoyment to things: As much as I ever did  I have been anxious or worried for no good reason: No, not at all  I have felt scared or panicky for no good reason: No, not much  Things have been getting on top of me: Yes, sometimes I haven't been coping as well as usual  I have been so unhappy that I have had difficulty sleeping: Not at all  I have felt sad or miserable: No, not at all  I have been so unhappy that I have been crying: No, never  The thought of harming myself has occurred to me: Never  Total: 5    OBSTETRIC HISTORY  OB History    Para Term  AB Living   2 1 1   1 1   SAB IAB Ectopic Multiple Live Births   1     0 1      # Outcome Date GA Lbr Abel/2nd Weight Sex Type Anes PTL Lv   2 Term 24 39w1d / 03:08 6 lb 13.7 oz (3.11 kg) F NORMAL SPONT EPI N CRUZ      Complications: Temp 100.4 or greater   1 SAB 2023 8w0d             Birth Comments: D&C x 2       GYNE HISTORY        MEDICATIONS:    Current Outpatient Medications:     Norethindrone (MAZIN-BE) 0.35 MG Oral Tab, Take 1 tablet (0.35 mg total) by mouth daily., Disp: 3 each, Rfl: 1    benzocaine-menthol 20-0.5 % External Aerosol, Apply 1 spray topically daily as needed (for perineal pain). AVAILABLE WITHOUT A PRESCRIPTION, Disp: 1 each, Rfl: 0    acetaminophen 500 MG Oral Tab, Take 2 tablets (1,000 mg total) by mouth every 6 (six) hours as needed for Pain. AVAILABLE WITHOUT A PRESCRIPTION, Disp: 30 tablet, Rfl: 0    docusate sodium 100 MG Oral Cap, Take 100 mg by mouth 2 (two) times daily.  AVAILABLE WITHOUT A PRESCRIPTION, Disp: 30 capsule, Rfl: 0    Ferrous Sulfate 325 (65 Fe) MG Oral Tab, Take 1 tablet (325 mg total) by mouth daily with food., Disp: 90 tablet, Rfl: 0    Microlet Lancets Does not apply Misc, Check blood sugars 4 times per day, Disp: 400 each, Rfl: 0    prenatal vitamin with DHA 27-0.8-228 MG Oral Cap, Take 1 capsule by mouth daily., Disp: , Rfl:     ibuprofen 600 MG Oral Tab, Take 1 tablet (600 mg total) by mouth every 6 (six) hours as needed for Pain. (Patient not taking: Reported on 12/23/2024), Disp: 20 tablet, Rfl: 0    ALLERGIES:  Allergies[1]    PHYSICAL EXAM  Blood pressure 117/79, pulse 97, weight 175 lb (79.4 kg), last menstrual period 02/13/2024, currently breastfeeding.  General:  Well nourished, well developed woman in no acute distress  Abdomen:  soft, nontender, no masses  External Genitalia: normal appearance, hair distribution, and no lesions  Vagina:  Normal appearance without lesions, no abnormal discharge  Cervix:  Normal without tenderness on motion  Uterus: normal in size, contour, position, mobility, without tenderness  Adnexa: normal without masses or tenderness  Perineum: well healed perineum  Anus: no hemorroids       ASSESSMENT/PLAN  Mamta was seen today for postpartum care.    Diagnoses and all orders for this visit:    Postpartum state (HCC)  -     Glucose Tolerance 75 gm (0hr,2hr) Nongestational; Future    Other orders  -     Norethindrone (MAZIN-BE) 0.35 MG Oral Tab; Take 1 tablet (0.35 mg total) by mouth daily.      Discussed all options of birthcontrol including ocps, minipill, Mirena or Paragard IUD, nuvaring, orthoevra patch, nexplanon, Depoprovera, condoms or tubal sterilization options. Patient has chosen POPs.    2h gtt rx'ed    Patient to return for annual gyne exam in 6 months.         [1] No Known Allergies

## 2025-01-15 ENCOUNTER — TELEPHONE (OUTPATIENT)
Age: 30
End: 2025-01-15

## 2025-01-23 ENCOUNTER — TELEPHONE (OUTPATIENT)
Age: 30
End: 2025-01-23

## 2025-01-23 NOTE — TELEPHONE ENCOUNTER
[facility] to schedule an overdue F/U appointment and labs, with Dr. Green called 1/23/25

## 2025-02-05 ENCOUNTER — TELEPHONE (OUTPATIENT)
Age: 30
End: 2025-02-05

## 2025-02-05 NOTE — TELEPHONE ENCOUNTER
We have been calling this patient to schedule an overdue appointment with Dr. Green or Amanda CORBETT And have outpt labs before her appointment on 3 different days. Please advise. 2/5/25

## 2025-07-25 ENCOUNTER — TELEPHONE (OUTPATIENT)
Dept: OBGYN CLINIC | Facility: CLINIC | Age: 30
End: 2025-07-25

## 2025-07-25 NOTE — TELEPHONE ENCOUNTER
Mamta calling with +HPT.   She delivered with us Nov 2024. She is done breastfeeding.   LMP was 6/19/25. Cycles are regular, 28 d x last 3 months. Today she is dated at 5w1d.  Patient agrees to policy to rotate care between all providers in the office, and understands that on-call provider will deliver her.   Patient directed to start PNV with iron, DHA and folic acid.     Ht 5'4\"  WT 77 kg    OBN appt scheduled on 8/7/25.

## 2025-08-07 ENCOUNTER — APPOINTMENT (OUTPATIENT)
Dept: ULTRASOUND IMAGING | Facility: HOSPITAL | Age: 30
End: 2025-08-07
Attending: EMERGENCY MEDICINE

## 2025-08-07 ENCOUNTER — HOSPITAL ENCOUNTER (EMERGENCY)
Facility: HOSPITAL | Age: 30
Discharge: HOME | End: 2025-08-07
Attending: EMERGENCY MEDICINE

## 2025-08-07 ENCOUNTER — TELEPHONE (OUTPATIENT)
Dept: OBGYN CLINIC | Facility: CLINIC | Age: 30
End: 2025-08-07

## 2025-08-07 VITALS
TEMPERATURE: 98 F | HEIGHT: 64 IN | BODY MASS INDEX: 28.98 KG/M2 | HEART RATE: 108 BPM | OXYGEN SATURATION: 100 % | WEIGHT: 169.75 LBS | RESPIRATION RATE: 18 BRPM | SYSTOLIC BLOOD PRESSURE: 114 MMHG | DIASTOLIC BLOOD PRESSURE: 81 MMHG

## 2025-08-07 DIAGNOSIS — R10.30 LOWER ABDOMINAL PAIN: Primary | ICD-10-CM

## 2025-08-07 DIAGNOSIS — N83.201 CYST OF RIGHT OVARY: ICD-10-CM

## 2025-08-07 DIAGNOSIS — Z3A.01 LESS THAN 8 WEEKS GESTATION OF PREGNANCY (HCC): ICD-10-CM

## 2025-08-07 LAB
ANION GAP SERPL CALC-SCNC: 9 MMOL/L (ref 0–18)
B-HCG SERPL-ACNC: ABNORMAL MIU/ML (ref ?–4.2)
B-HCG UR QL: POSITIVE
BASOPHILS # BLD AUTO: 0.01 X10(3) UL (ref 0–0.2)
BASOPHILS NFR BLD AUTO: 0.1 %
BILIRUB UR QL: NEGATIVE
BUN BLD-MCNC: 9 MG/DL (ref 9–23)
BUN/CREAT SERPL: 14.1 (ref 10–20)
CALCIUM BLD-MCNC: 9.7 MG/DL (ref 8.7–10.4)
CHLORIDE SERPL-SCNC: 104 MMOL/L (ref 98–112)
CLARITY UR: CLEAR
CO2 SERPL-SCNC: 24 MMOL/L (ref 21–32)
CREAT BLD-MCNC: 0.64 MG/DL (ref 0.55–1.02)
DEPRECATED RDW RBC AUTO: 39.1 FL (ref 35.1–46.3)
EGFRCR SERPLBLD CKD-EPI 2021: 122 ML/MIN/1.73M2 (ref 60–?)
EOSINOPHIL # BLD AUTO: 0.01 X10(3) UL (ref 0–0.7)
EOSINOPHIL NFR BLD AUTO: 0.1 %
ERYTHROCYTE [DISTWIDTH] IN BLOOD BY AUTOMATED COUNT: 19.3 % (ref 11–15)
GLUCOSE BLD-MCNC: 93 MG/DL (ref 70–99)
GLUCOSE UR-MCNC: NORMAL MG/DL
HCT VFR BLD AUTO: 37 % (ref 35–48)
HGB BLD-MCNC: 11.9 G/DL (ref 12–16)
HGB UR QL STRIP.AUTO: NEGATIVE
IMM GRANULOCYTES # BLD AUTO: 0.02 X10(3) UL (ref 0–1)
IMM GRANULOCYTES NFR BLD: 0.3 %
KETONES UR-MCNC: NEGATIVE MG/DL
LEUKOCYTE ESTERASE UR QL STRIP.AUTO: NEGATIVE
LYMPHOCYTES # BLD AUTO: 2.03 X10(3) UL (ref 1–4)
LYMPHOCYTES NFR BLD AUTO: 26.6 %
MCH RBC QN AUTO: 20.4 PG (ref 26–34)
MCHC RBC AUTO-ENTMCNC: 32.2 G/DL (ref 31–37)
MCV RBC AUTO: 63.4 FL (ref 80–100)
MONOCYTES # BLD AUTO: 0.54 X10(3) UL (ref 0.1–1)
MONOCYTES NFR BLD AUTO: 7.1 %
NEUTROPHILS # BLD AUTO: 5.03 X10 (3) UL (ref 1.5–7.7)
NEUTROPHILS # BLD AUTO: 5.03 X10(3) UL (ref 1.5–7.7)
NEUTROPHILS NFR BLD AUTO: 65.8 %
NITRITE UR QL STRIP.AUTO: NEGATIVE
OSMOLALITY SERPL CALC.SUM OF ELEC: 282 MOSM/KG (ref 275–295)
PH UR: 5.5 (ref 5–8)
PLATELET # BLD AUTO: 382 10(3)UL (ref 150–450)
POTASSIUM SERPL-SCNC: 3.8 MMOL/L (ref 3.5–5.1)
PROT UR-MCNC: NEGATIVE MG/DL
RBC # BLD AUTO: 5.84 X10(6)UL (ref 3.8–5.3)
SODIUM SERPL-SCNC: 137 MMOL/L (ref 136–145)
SP GR UR STRIP: 1.01 (ref 1–1.03)
UROBILINOGEN UR STRIP-ACNC: NORMAL
WBC # BLD AUTO: 7.6 X10(3) UL (ref 4–11)

## 2025-08-07 PROCEDURE — 85025 COMPLETE CBC W/AUTO DIFF WBC: CPT | Performed by: EMERGENCY MEDICINE

## 2025-08-07 PROCEDURE — 85060 BLOOD SMEAR INTERPRETATION: CPT | Performed by: EMERGENCY MEDICINE

## 2025-08-07 PROCEDURE — 96360 HYDRATION IV INFUSION INIT: CPT

## 2025-08-07 PROCEDURE — 85060 BLOOD SMEAR INTERPRETATION: CPT

## 2025-08-07 PROCEDURE — 81003 URINALYSIS AUTO W/O SCOPE: CPT | Performed by: EMERGENCY MEDICINE

## 2025-08-07 PROCEDURE — 81025 URINE PREGNANCY TEST: CPT

## 2025-08-07 PROCEDURE — 99284 EMERGENCY DEPT VISIT MOD MDM: CPT

## 2025-08-07 PROCEDURE — 80048 BASIC METABOLIC PNL TOTAL CA: CPT | Performed by: EMERGENCY MEDICINE

## 2025-08-07 PROCEDURE — 76801 OB US < 14 WKS SINGLE FETUS: CPT | Performed by: EMERGENCY MEDICINE

## 2025-08-07 PROCEDURE — 85025 COMPLETE CBC W/AUTO DIFF WBC: CPT

## 2025-08-07 PROCEDURE — 96361 HYDRATE IV INFUSION ADD-ON: CPT

## 2025-08-07 PROCEDURE — 80048 BASIC METABOLIC PNL TOTAL CA: CPT

## 2025-08-07 PROCEDURE — 84702 CHORIONIC GONADOTROPIN TEST: CPT

## 2025-08-07 PROCEDURE — 76817 TRANSVAGINAL US OBSTETRIC: CPT | Performed by: EMERGENCY MEDICINE

## 2025-08-07 PROCEDURE — 84702 CHORIONIC GONADOTROPIN TEST: CPT | Performed by: EMERGENCY MEDICINE

## 2025-08-07 PROCEDURE — 99285 EMERGENCY DEPT VISIT HI MDM: CPT

## 2025-08-13 ENCOUNTER — NURSE ONLY (OUTPATIENT)
Dept: OBGYN CLINIC | Facility: CLINIC | Age: 30
End: 2025-08-13

## 2025-08-13 DIAGNOSIS — Z34.81 ENCOUNTER FOR SUPERVISION OF OTHER NORMAL PREGNANCY IN FIRST TRIMESTER (HCC): Primary | ICD-10-CM

## 2025-08-20 ENCOUNTER — TELEPHONE (OUTPATIENT)
Dept: OBGYN CLINIC | Facility: CLINIC | Age: 30
End: 2025-08-20

## 2025-08-20 ENCOUNTER — LAB ENCOUNTER (OUTPATIENT)
Dept: LAB | Facility: HOSPITAL | Age: 30
End: 2025-08-20
Attending: OBSTETRICS & GYNECOLOGY

## 2025-08-20 DIAGNOSIS — Z34.81 ENCOUNTER FOR SUPERVISION OF OTHER NORMAL PREGNANCY IN FIRST TRIMESTER (HCC): ICD-10-CM

## 2025-08-20 LAB
ANTIBODY SCREEN: NEGATIVE
GLUCOSE 1H P GLC SERPL-MCNC: 148 MG/DL (ref 70–130)
HBV SURFACE AG SER-ACNC: <0.1
HBV SURFACE AG SERPL QL IA: NONREACTIVE
HCV AB SERPL QL IA: NONREACTIVE
RH BLOOD TYPE: POSITIVE
RUBV IGG SER QL: POSITIVE
RUBV IGG SER-ACNC: 353 IU/ML (ref 10–?)
T PALLIDUM AB SER QL IA: NONREACTIVE

## 2025-08-20 PROCEDURE — 87389 HIV-1 AG W/HIV-1&-2 AB AG IA: CPT

## 2025-08-20 PROCEDURE — 86780 TREPONEMA PALLIDUM: CPT

## 2025-08-20 PROCEDURE — 86900 BLOOD TYPING SEROLOGIC ABO: CPT

## 2025-08-20 PROCEDURE — 86850 RBC ANTIBODY SCREEN: CPT

## 2025-08-20 PROCEDURE — 87340 HEPATITIS B SURFACE AG IA: CPT

## 2025-08-20 PROCEDURE — 36415 COLL VENOUS BLD VENIPUNCTURE: CPT

## 2025-08-20 PROCEDURE — 86803 HEPATITIS C AB TEST: CPT

## 2025-08-20 PROCEDURE — 82950 GLUCOSE TEST: CPT

## 2025-08-20 PROCEDURE — 86762 RUBELLA ANTIBODY: CPT

## 2025-08-20 PROCEDURE — 86787 VARICELLA-ZOSTER ANTIBODY: CPT

## 2025-08-20 PROCEDURE — 86901 BLOOD TYPING SEROLOGIC RH(D): CPT

## 2025-08-21 ENCOUNTER — INITIAL PRENATAL (OUTPATIENT)
Dept: OBGYN CLINIC | Facility: CLINIC | Age: 30
End: 2025-08-21

## 2025-08-21 VITALS
WEIGHT: 169.38 LBS | HEART RATE: 96 BPM | SYSTOLIC BLOOD PRESSURE: 113 MMHG | DIASTOLIC BLOOD PRESSURE: 77 MMHG | BODY MASS INDEX: 29 KG/M2

## 2025-08-21 DIAGNOSIS — O36.80X0 PREGNANCY WITH UNCERTAIN FETAL VIABILITY, SINGLE OR UNSPECIFIED FETUS (HCC): ICD-10-CM

## 2025-08-21 DIAGNOSIS — Z12.4 SCREENING FOR CERVICAL CANCER: ICD-10-CM

## 2025-08-21 DIAGNOSIS — Z34.81 ENCOUNTER FOR SUPERVISION OF OTHER NORMAL PREGNANCY IN FIRST TRIMESTER (HCC): Primary | ICD-10-CM

## 2025-08-21 PROBLEM — D56.1 BETA THALASSEMIA (HCC): Status: ACTIVE | Noted: 2025-08-21

## 2025-08-21 PROBLEM — O24.419 GDM (GESTATIONAL DIABETES MELLITUS) (HCC): Status: RESOLVED | Noted: 2024-09-06 | Resolved: 2025-08-21

## 2025-08-21 PROBLEM — Z34.90 PREGNANCY (HCC): Status: RESOLVED | Noted: 2024-11-12 | Resolved: 2025-08-21

## 2025-08-21 PROBLEM — D56.3 BETA THALASSEMIA TRAIT: Status: RESOLVED | Noted: 2024-07-19 | Resolved: 2025-08-21

## 2025-08-21 PROBLEM — O99.013 ANEMIA DURING PREGNANCY IN THIRD TRIMESTER (HCC): Status: RESOLVED | Noted: 2024-10-09 | Resolved: 2025-08-21

## 2025-08-21 PROBLEM — R71.8 RBC MICROCYTOSIS: Status: RESOLVED | Noted: 2024-06-12 | Resolved: 2025-08-21

## 2025-08-21 PROBLEM — D62 ANEMIA ASSOCIATED WITH ACUTE BLOOD LOSS: Status: RESOLVED | Noted: 2024-11-15 | Resolved: 2025-08-21

## 2025-08-21 LAB
APPEARANCE: CLEAR
BILIRUBIN: NEGATIVE
GLUCOSE (URINE DIPSTICK): NEGATIVE MG/DL
KETONES (URINE DIPSTICK): NEGATIVE MG/DL
LEUKOCYTES: NEGATIVE
MULTISTIX LOT#: NORMAL NUMERIC
NITRITE, URINE: NEGATIVE
OCCULT BLOOD: NEGATIVE
PH, URINE: 6.5 (ref 4.5–8)
PROTEIN (URINE DIPSTICK): NEGATIVE MG/DL
SPECIFIC GRAVITY: 1.01 (ref 1–1.03)
URINE-COLOR: YELLOW
UROBILINOGEN,SEMI-QN: 0.2 MG/DL (ref 0–1.9)

## 2025-08-21 PROCEDURE — 76801 OB US < 14 WKS SINGLE FETUS: CPT | Performed by: OBSTETRICS & GYNECOLOGY

## 2025-08-21 PROCEDURE — 81002 URINALYSIS NONAUTO W/O SCOPE: CPT | Performed by: OBSTETRICS & GYNECOLOGY

## 2025-08-22 LAB
C TRACH DNA SPEC QL NAA+PROBE: NEGATIVE
HPV E6+E7 MRNA CVX QL NAA+PROBE: NEGATIVE
N GONORRHOEA DNA SPEC QL NAA+PROBE: NEGATIVE
T VAGINALIS RRNA SPEC QL NAA+PROBE: NEGATIVE
VZV IGG SER IA-ACNC: 6.49 (ref 1–?)

## (undated) DEVICE — CANNULA VAC ASP 7MM CRV VCRT

## (undated) DEVICE — GLOVE SURG 8 PROTEXIS PI LF CRM PF BEAD CUFF STRL PLISPRN

## (undated) DEVICE — GLOVE SURG 7.5 PROTEXIS PI LF CRM PF BEAD CUFF STRL PLISPRN

## (undated) DEVICE — CATHETER BARDEX 14FR 16IN INTMT ROBINSON MODEL URTH LTX

## (undated) DEVICE — GLOVE SURG 6.5 PROTEXIS PI LF CRM PF BEAD CUFF STRL PLISPRN

## (undated) DEVICE — GLOVE SURG 7 PROTEXIS PI LF CRM PF BEAD CUFF STRL PLISPRN

## (undated) DEVICE — GOWN SURG XL L3 REINFORCE SET IN SLV STRL LF DISP BLUE

## (undated) DEVICE — DEVICE VAC XTRT TUBE COLLECTION SET HNDL SLIP RING STRL DISP

## (undated) DEVICE — Device

## (undated) DEVICE — GLOVE SURG 6.5 PROTEXIS LF BLUE PF SMTH BEAD CUFF INTLK STRL

## (undated) DEVICE — COVER PROBE FLX-FEEL 48X6IN OR 4 FLAG 2 SHT 24 PRINT STRL LF

## (undated) DEVICE — GLOVE SURG 6 PROTEXIS LF CRM PF BEAD CUFF STRL PLISPRN 11.3

## (undated) DEVICE — CRADLE PSTN DEVON ARM FOAM LMI LF

## (undated) DEVICE — GLOVE SURG 5.5 PROTEXIS LF CRM PF BEAD CUFF INTLK STRL

## (undated) DEVICE — BLANKET WRM UPR BODY ADULT 74X24IN BR HGR PLMR 2 HOSE PORT

## (undated) NOTE — LETTER
VACCINE ADMINISTRATION RECORD  PARENT / GUARDIAN APPROVAL  Date: 2024  Vaccine administered to: Mamta Naqvi     : 1995    MRN: [id number]    A copy of the appropriate Centers for Disease Control and Prevention Vaccine Information statement has been provided. I have read or have had explained the information about the diseases and the vaccines listed below. There was an opportunity to ask questions and any questions were answered satisfactorily. I believe that I understand the benefits and risks of the vaccine cited and ask that the vaccine(s) listed below be given to me or to the person named above (for whom I am authorized to make this request).    VACCINES ADMINISTERED:  Tdap    I have read and hereby agree to be bound by the terms of this agreement as stated above. My signature is valid until revoked by me in writing.  This document is signed by Mamta Naqvi, relationship: Self on 2024.:                                                                                                                                         Parent / Guardian Signature                                                Date    Lori Escudero CMA served as a witness to authentication that the identity of the person signing electronically is in fact the person represented as signing.    This document was generated by Lori Escudero CMA on 2024.

## (undated) NOTE — LETTER
[facility]  Administration of Anesthesia  IMamta agree to be cared for by a physician anesthesiologist alone and/or with a nurse anesthetist, who is specially trained to monitor me and give me medicine to put me to sleep or keep me comfortable during my procedure    I understand that my anesthesiologist and/or anesthetist is not an employee or agent of [facility] or [facility]. He or she works for [facility]    As the patient asking for anesthesia services, I agree to:  Allow the anesthesiologist (anesthesia doctor) to give me medicine and do additional procedures as necessary. Some examples are: Starting or using an “IV” to give me medicine, fluids or blood during my procedure, and having a breathing tube placed to help me breathe when I’m asleep (intubation). In the event that my heart stops working properly, I understand that my anesthesiologist will make every effort to sustain my life, unless otherwise directed by [facility] Do Not Resuscitate documents.  Tell my anesthesia doctor before my procedure:  If I am pregnant.  The last time that I ate or drank.  iii. All of the medicines I take (including prescriptions, herbal supplements, and pills I can buy without a prescription (including street drugs/illegal medications). Failure to inform my anesthesiologist about these medicines may increase my risk of anesthetic complications.  iv.If I am allergic to anything or have had a reaction to anesthesia before.  I understand how the anesthesia medicine will help me (benefits).  I understand that with any type of anesthesia medicine there are risks:  The most common risks are: nausea, vomiting, sore throat, muscle soreness, damage to my eyes, mouth, or teeth (from breathing tube placement).  Rare risks include: remembering what happened during my procedure, allergic reactions to medications, injury to my airway, heart, lungs, vision, nerves, or  muscles and in extremely rare instances death.  My doctor has explained to me other choices available to me for my care (alternatives).  Pregnant Patients (“epidural”):  I understand that the risks of having an epidural (medicine given into my back to help control pain during labor), include itching, low blood pressure, difficulty urinating, headache or slowing of the baby’s heart. Very rare risks include infection, bleeding, seizure, irregular heart rhythms and nerve injury.  Regional Anesthesia (“spinal”, “epidural”, & “nerve blocks”):  I understand that rare but potential complications include headache, bleeding, infection, seizure, irregular heart rhythms, and nerve injury.    _____________________________________________________________________________  Patient (or Representative) Signature/Relationship to Patient  Date   Time    _____________________________________________________________________________   Name (if used)    Language/Organization   Time    _____________________________________________________________________________  Nurse Anesthetist Signature     Date   Time  _____________________________________________________________________________  Anesthesiologist Signature     Date   Time  I have discussed the procedure and information above with the patient (or patient’s representative) and answered their questions. The patient or their representative has agreed to have anesthesia services.    _____________________________________________________________________________  Witness        Date   Time  I have verified that the signature is that of the patient or patient’s representative, and that it was signed before the procedure  Patient Name: Mamta Naqvi     : 1995                 Printed: 2024 at 10:49 AM    Medical Record #: [id number]                                            Page 1 of 1  ----------ANESTHESIA CONSENT----------

## (undated) NOTE — LETTER
Patient Name: Mamta Naqvi  : 1995  MRN: [id number]  Patient Address: [address] [place] [address] Dr Taran archuleta  [facility] [address]      COVID-19  -       [facility] is committed to the safety and well-being of our patients, visitors, employees, and communities. Please review this entire document. It includes information related to pending tests, positive results and aftercare.    Patients with pending COVID-19 test results should follow all care and home isolation instructions. An [facility] representative will call with your test results. Results will also be available on [facility].    Home Isolation    If you have tested positive for COVID-19 OR if you are sick and suspect that you have COVID-19 but do not yet have test results, you should remain under home isolation and follow the below guidelines:    Step 1: Stay home and away from others until at least 24 hours after both:  Your symptoms are getting better overall, and  You have not had a fever (and are not using fever-reducing medication)    Step 2: Resume normal activities, and use added prevention strategies over the next five days. Clean your hands often, wear a well-fitting mask when around others and keep a distance from others. Since some people remain contagious beyond the “stay-at-home” period, taking added precautions can lower the chance of spreading respiratory viruses to others.    Follow your employer’s specific return to work policies as the above guidelines may not apply in all settings.     Talk to Your Healthcare Provider    If you test positive for COVID-19, notify your healthcare provider as soon as possible to discuss potential treatment options. Patients who are 65 years or older, immunocompromised, or have other high-risk conditions are candidates for oral antiviral treatment, such as paxlovid and molnupiravir. These treatments, when appropriate, should be started as early as possible to prevent mild symptoms from  becoming severe.     Seek Further Care  If your symptoms do not improve after one week, contact your healthcare provider. If you develop symptoms such as: extreme weakness, difficult breathing, persistent pain or pressure in the chest, or other symptoms that are severe or concerning to you, you should contact your healthcare provider or seek emergency medical attention.     10 Ways to Manage Your Health at Home    Stay home from work, school, and other public places. If you must go out, avoid using any kind of public transportation, ridesharing, or taxis.  If you cannot avoid using public transportation always wear a mask.  Carefully monitor your symptoms. If you are 65 years or older or have a high-risk condition don’t wait for symptoms to become severe to contact your healthcare provider.  If your symptoms get worse, call your healthcare provider immediately.   Get rest and stay hydrated.  If you have a medical appointment, call the healthcare provider ahead of time and tell them that you have or may have COVID-19.  For medical emergencies, call 911 and notify the dispatch personnel that you have or may have COVID-19.  Cover your cough and sneeze.  Wash your hands often with soap and water for at least 20 seconds or clean your hands with an alcohol-based hand  that contains at least 60% alcohol.  As much as possible, stay in a specific room and away from other people in your home. You should use a separate bathroom, if available. If you need to be around other people in or outside of the home, wear a facemask.  Avoid sharing personal items with other people in your household, like dishes, towels, and bedding.  Clean all surfaces that are touched often, like counters, tabletops, and doorknobs. Use household cleaning sprays or wipes according to the label instructions.    Additional Information    You can also get more information at the following websites:    Centers for Disease Control and Prevnetion  (CDC):  https://www.cdc.gov/respiratory-viruses/guidance/respiratory-virus-guidance.html      [facility]: [email or web address]